# Patient Record
Sex: MALE | Race: WHITE | NOT HISPANIC OR LATINO | URBAN - METROPOLITAN AREA
[De-identification: names, ages, dates, MRNs, and addresses within clinical notes are randomized per-mention and may not be internally consistent; named-entity substitution may affect disease eponyms.]

---

## 2024-11-05 ENCOUNTER — TELEPHONE (OUTPATIENT)
Dept: PAIN MEDICINE | Facility: CLINIC | Age: 64
End: 2024-11-05

## 2024-11-05 ENCOUNTER — OFFICE VISIT (OUTPATIENT)
Dept: PAIN MEDICINE | Facility: CLINIC | Age: 64
End: 2024-11-05
Payer: MEDICARE

## 2024-11-05 VITALS
SYSTOLIC BLOOD PRESSURE: 148 MMHG | HEIGHT: 71 IN | HEART RATE: 63 BPM | DIASTOLIC BLOOD PRESSURE: 76 MMHG | WEIGHT: 275 LBS | BODY MASS INDEX: 38.5 KG/M2

## 2024-11-05 DIAGNOSIS — T85.192A MALFUNCTION OF SPINAL CORD STIMULATOR, INITIAL ENCOUNTER: Primary | ICD-10-CM

## 2024-11-05 DIAGNOSIS — Z45.42 BATTERY END OF LIFE OF SPINAL CORD STIMULATOR: Primary | ICD-10-CM

## 2024-11-05 DIAGNOSIS — Z45.42 BATTERY END OF LIFE OF SPINAL CORD STIMULATOR: ICD-10-CM

## 2024-11-05 PROCEDURE — 3078F DIAST BP <80 MM HG: CPT | Mod: CPTII,S$GLB,, | Performed by: STUDENT IN AN ORGANIZED HEALTH CARE EDUCATION/TRAINING PROGRAM

## 2024-11-05 PROCEDURE — 3008F BODY MASS INDEX DOCD: CPT | Mod: CPTII,S$GLB,, | Performed by: STUDENT IN AN ORGANIZED HEALTH CARE EDUCATION/TRAINING PROGRAM

## 2024-11-05 PROCEDURE — 99999 PR PBB SHADOW E&M-NEW PATIENT-LVL III: CPT | Mod: PBBFAC,,, | Performed by: STUDENT IN AN ORGANIZED HEALTH CARE EDUCATION/TRAINING PROGRAM

## 2024-11-05 PROCEDURE — 1159F MED LIST DOCD IN RCRD: CPT | Mod: CPTII,S$GLB,, | Performed by: STUDENT IN AN ORGANIZED HEALTH CARE EDUCATION/TRAINING PROGRAM

## 2024-11-05 PROCEDURE — 99203 OFFICE O/P NEW LOW 30 MIN: CPT | Mod: S$GLB,,, | Performed by: STUDENT IN AN ORGANIZED HEALTH CARE EDUCATION/TRAINING PROGRAM

## 2024-11-05 PROCEDURE — 3077F SYST BP >= 140 MM HG: CPT | Mod: CPTII,S$GLB,, | Performed by: STUDENT IN AN ORGANIZED HEALTH CARE EDUCATION/TRAINING PROGRAM

## 2024-11-05 NOTE — PROGRESS NOTES
[facility]    Referred by:   Self, Aaareferral    PCP:   No primary care provider on file.    CHIEF COMPLAINT:   Malfunctioning spinal cord stimulator    HISTORY OF PRESENT ILLNESS:   Gonzalez Pollard presents for evaluation of a malfunctioning spinal cord stimulator.  This was implanted in the mid 20-teens for lower back and leg pain associated with radiculopathy and neuropathy.  His system is a [employer] system.  He reports that several months ago, he began having difficulties charging the device.  This is happening intermittently but with increasing frequency.  Additionally, he reports that he has discomfort around the IPG and feels that the device catches on chairs when he sits down.  He does feel that this system is significantly beneficial to him when it is functioning.  He would like it replaced.  He has not had any significant changes in his lower back or leg symptoms.  He does not have any myelopathic symptoms.  They deny fever, chills, night sweats, N/V, diarrhea, SOB, and chest pain.    PAIN SCORES:  Vitals:    11/05/24 1315   PainSc:   6   PainLoc: Back     Review of patient's allergies indicates:  No Known Allergies  Penicillin    Past Medical History:   Diagnosis Date    BPH (benign prostatic hyperplasia)     Chronic pain     Chronic vertigo     CVA (cerebral vascular accident)     HLD (hyperlipidemia)     Insomnia     Low back pain     Neuropathy     Obesity, unspecified     ANN (obstructive sleep apnea)     Right-sided extracranial carotid artery stenosis     Type 2 diabetes mellitus without complications     Unspecified sensorineural hearing loss      Surgical history   Cervical fusion  Lumbar spine surgery x4  Right knee scope recently    Medications  Aspirin 81   Atorvastatin 40   B vitamin complex   Duloxetine 60 nightly   fexofenadine 180 daily   gabapentin 600 t.i.d.  Hydrocodone 5/325 1-2 tablets q.6 p.r.n.  Metformin 500mg daily  Flomax  Trazodone 150mg nightly      Social  "History:  Retired           Family history reviewed in the patient's chart.     PHYSICAL EXAMINATION  VITALS:   Vitals:    11/05/24 1315   BP: (!) 148/76   Pulse: 63   Weight: 124.7 kg (275 lb)   Height: 5' 11" (1.803 m)   PainSc:   6   PainLoc: Back     GENERAL: Calm, cooperative, pleasant  CHEST: No increased work of breathing  SKIN: Intact    MSK/NEURO:  IPG site in the left flank  Lumbar range of motion normal   Strength is intact throughout the right lower extremity and is very mildly diminished 4+ out of 5 in the left lower extremity  Sensation to light touch is diminished distally in both lower extremities  Reflexes are trace and symmetric at the patellar and Achilles tendons   No clonus  Flexor plantar responses    LABS:  9/2024  A1c 6.8%   Plt 286  Cr 0.69  eGFR >90  AST 24  ALT 30    IMAGING:    None to review    ASSESSMENT:   64 y.o. year old male with a malfunctioning spinal cord stimulator IPG as well as discomfort around the battery site.    Encounter Diagnoses   Name Primary?    Malfunction of spinal cord stimulator, initial encounter Yes    Battery end of life of spinal cord stimulator        PLAN:  Plan for a battery swap.  [employer] representatives were present and evaluated the patient and his system today.  They were unable to correct the charging errors.  Return 1 week after battery swap      Nikunj Reynolds MD  This note was completed with dictation software and grammatical/syntax errors may exist.    "

## 2024-11-05 NOTE — TELEPHONE ENCOUNTER
Types of orders made on 11/05/2024: Procedure Request      Order Date:11/5/2024   Ordering User:NIKUNJ VARGAS [[id number]]   Encounter Provider:Nikunj Vargas MD [[id number]]   Authorizing Provider: Nikunj Vargas MD [[id number]]   Department:[facility] PAIN MANAGEMENT[[id number]]      Common Order Information   Procedure -> Other (Specify location and laterality) Cmt: SCS battery swap [employer]      Order Specific Information   Order: Procedure Order to Pain Management [Custom: [id number]]  Order #:          [id number]: 1 FUTURE     Priority: Routine  Class: Clinic Performed     Future Order Information       Expires on:11/05/2025            Expected by:11/05/2024                   Associated Diagnoses       T85.192A Malfunction of spinal cord stimulator, initial encounter       Z45.42 Battery end of life of spinal cord stimulator       Physician -> Parminder          Is patient on anti-coagulants? Cmt: ASA          Facility Name: -> [place]          Follow-up: -> 1 week              Priority: Routine  Class: Clinic Performed     Future Order Information       Expires on:11/05/2025            Expected by:11/05/2024                   Associated Diagnoses       T85.192A Malfunction of spinal cord stimulator, initial encounter       Z45.42 Battery end of life of spinal cord stimulator       Procedure -> Other (Specify location and laterality) Cmt: SCS battery sw

## 2024-11-14 ENCOUNTER — TELEPHONE (OUTPATIENT)
Dept: PAIN MEDICINE | Facility: CLINIC | Age: 64
End: 2024-11-14
Payer: MEDICARE

## 2024-11-14 NOTE — TELEPHONE ENCOUNTER
----- Message from Yen sent at 11/14/2024  2:03 PM CST -----  Regarding: Needs Medical Advice  Contact: patient at [phone]  Type: Needs Medical Advice  Who Called:  patient at [phone]    Additional Information: calling to get details for procedure and if there is a preop. Please call and advise. Thank you

## 2024-11-18 RX ORDER — TAMSULOSIN HYDROCHLORIDE 0.4 MG/1
0.4 CAPSULE ORAL DAILY
COMMUNITY
Start: 2024-08-26

## 2024-11-18 RX ORDER — ATORVASTATIN CALCIUM 40 MG/1
1 TABLET, FILM COATED ORAL DAILY
COMMUNITY
Start: 2024-03-14

## 2024-11-18 RX ORDER — GABAPENTIN 600 MG/1
600 TABLET ORAL
COMMUNITY
Start: 2024-03-14

## 2024-11-18 RX ORDER — METFORMIN HYDROCHLORIDE 500 MG/1
500 TABLET, EXTENDED RELEASE ORAL DAILY
COMMUNITY
Start: 2024-05-22

## 2024-11-18 RX ORDER — ASPIRIN 81 MG/1
81 TABLET ORAL
COMMUNITY
Start: 2024-10-09

## 2024-11-18 RX ORDER — DULOXETIN HYDROCHLORIDE 60 MG/1
60 CAPSULE, DELAYED RELEASE ORAL DAILY
COMMUNITY
Start: 2024-08-26

## 2024-11-18 RX ORDER — TRAZODONE HYDROCHLORIDE 150 MG/1
1 TABLET ORAL NIGHTLY PRN
COMMUNITY
Start: 2024-09-12

## 2024-11-18 NOTE — OR NURSING
PAT done via phone. All preop instructions reviewed with verbalized understanding. Stated did not know how to get into MyChart.

## 2024-11-20 ENCOUNTER — ANESTHESIA EVENT (OUTPATIENT)
Dept: SURGERY | Facility: HOSPITAL | Age: 64
End: 2024-11-20
Payer: MEDICARE

## 2024-11-21 ENCOUNTER — HOSPITAL ENCOUNTER (OUTPATIENT)
Facility: HOSPITAL | Age: 64
Discharge: HOME | End: 2024-11-21
Attending: STUDENT IN AN ORGANIZED HEALTH CARE EDUCATION/TRAINING PROGRAM | Admitting: STUDENT IN AN ORGANIZED HEALTH CARE EDUCATION/TRAINING PROGRAM
Payer: MEDICARE

## 2024-11-21 ENCOUNTER — ANESTHESIA (OUTPATIENT)
Dept: SURGERY | Facility: HOSPITAL | Age: 64
End: 2024-11-21
Payer: MEDICARE

## 2024-11-21 VITALS
OXYGEN SATURATION: 96 % | HEART RATE: 53 BPM | BODY MASS INDEX: 38.5 KG/M2 | RESPIRATION RATE: 16 BRPM | TEMPERATURE: 98 F | HEIGHT: 71 IN | DIASTOLIC BLOOD PRESSURE: 77 MMHG | WEIGHT: 275 LBS | SYSTOLIC BLOOD PRESSURE: 141 MMHG

## 2024-11-21 DIAGNOSIS — Z01.818 PREOP TESTING: ICD-10-CM

## 2024-11-21 DIAGNOSIS — Z45.42 BATTERY END OF LIFE OF SPINAL CORD STIMULATOR: Primary | ICD-10-CM

## 2024-11-21 LAB
ANION GAP SERPL CALC-SCNC: 12 MMOL/L (ref 8–16)
BASOPHILS # BLD AUTO: 0.02 K/UL (ref 0–0.2)
BASOPHILS NFR BLD: 0.3 % (ref 0–1.9)
BUN SERPL-MCNC: 11 MG/DL (ref 8–23)
CALCIUM SERPL-MCNC: 9.2 MG/DL (ref 8.7–10.5)
CHLORIDE SERPL-SCNC: 109 MMOL/L (ref 95–110)
CO2 SERPL-SCNC: 19 MMOL/L (ref 23–29)
CREAT SERPL-MCNC: 0.8 MG/DL (ref 0.5–1.4)
DIFFERENTIAL METHOD BLD: NORMAL
EOSINOPHIL # BLD AUTO: 0.1 K/UL (ref 0–0.5)
EOSINOPHIL NFR BLD: 1.4 % (ref 0–8)
ERYTHROCYTE [DISTWIDTH] IN BLOOD BY AUTOMATED COUNT: 13 % (ref 11.5–14.5)
EST. GFR  (NO RACE VARIABLE): >60 ML/MIN/1.73 M^2
GLUCOSE SERPL-MCNC: 129 MG/DL (ref 70–110)
HCT VFR BLD AUTO: 45 % (ref 40–54)
HGB BLD-MCNC: 14.7 G/DL (ref 14–18)
IMM GRANULOCYTES # BLD AUTO: 0.02 K/UL (ref 0–0.04)
IMM GRANULOCYTES NFR BLD AUTO: 0.3 % (ref 0–0.5)
LYMPHOCYTES # BLD AUTO: 1.7 K/UL (ref 1–4.8)
LYMPHOCYTES NFR BLD: 22 % (ref 18–48)
MCH RBC QN AUTO: 29.7 PG (ref 27–31)
MCHC RBC AUTO-ENTMCNC: 32.7 G/DL (ref 32–36)
MCV RBC AUTO: 91 FL (ref 82–98)
MONOCYTES # BLD AUTO: 0.9 K/UL (ref 0.3–1)
MONOCYTES NFR BLD: 12.2 % (ref 4–15)
NEUTROPHILS # BLD AUTO: 4.9 K/UL (ref 1.8–7.7)
NEUTROPHILS NFR BLD: 63.8 % (ref 38–73)
NRBC BLD-RTO: 0 /100 WBC
OHS QRS DURATION: 96 MS
OHS QTC CALCULATION: 414 MS
PLATELET # BLD AUTO: 278 K/UL (ref 150–450)
PMV BLD AUTO: 10.1 FL (ref 9.2–12.9)
POTASSIUM SERPL-SCNC: 4.2 MMOL/L (ref 3.5–5.1)
RBC # BLD AUTO: 4.95 M/UL (ref 4.6–6.2)
SODIUM SERPL-SCNC: 140 MMOL/L (ref 136–145)
WBC # BLD AUTO: 7.68 K/UL (ref 3.9–12.7)

## 2024-11-21 PROCEDURE — 93005 ELECTROCARDIOGRAM TRACING: CPT

## 2024-11-21 PROCEDURE — 71000033 HC RECOVERY, INTIAL HOUR: Performed by: STUDENT IN AN ORGANIZED HEALTH CARE EDUCATION/TRAINING PROGRAM

## 2024-11-21 PROCEDURE — 71000015 HC POSTOP RECOV 1ST HR: Performed by: STUDENT IN AN ORGANIZED HEALTH CARE EDUCATION/TRAINING PROGRAM

## 2024-11-21 PROCEDURE — C1822 GEN, NEURO, HF, RECHG BAT: HCPCS | Performed by: STUDENT IN AN ORGANIZED HEALTH CARE EDUCATION/TRAINING PROGRAM

## 2024-11-21 PROCEDURE — 36000707: Performed by: STUDENT IN AN ORGANIZED HEALTH CARE EDUCATION/TRAINING PROGRAM

## 2024-11-21 PROCEDURE — 93010 ELECTROCARDIOGRAM REPORT: CPT | Mod: ,,, | Performed by: GENERAL PRACTICE

## 2024-11-21 PROCEDURE — 37000008 HC ANESTHESIA 1ST 15 MINUTES: Performed by: STUDENT IN AN ORGANIZED HEALTH CARE EDUCATION/TRAINING PROGRAM

## 2024-11-21 PROCEDURE — 25000003 PHARM REV CODE 250: Performed by: ANESTHESIOLOGY

## 2024-11-21 PROCEDURE — 63600175 PHARM REV CODE 636 W HCPCS: Performed by: NURSE ANESTHETIST, CERTIFIED REGISTERED

## 2024-11-21 PROCEDURE — 71000039 HC RECOVERY, EACH ADD'L HOUR: Performed by: STUDENT IN AN ORGANIZED HEALTH CARE EDUCATION/TRAINING PROGRAM

## 2024-11-21 PROCEDURE — 27201423 OPTIME MED/SURG SUP & DEVICES STERILE SUPPLY: Performed by: STUDENT IN AN ORGANIZED HEALTH CARE EDUCATION/TRAINING PROGRAM

## 2024-11-21 PROCEDURE — 63600175 PHARM REV CODE 636 W HCPCS: Mod: JZ,JG | Performed by: STUDENT IN AN ORGANIZED HEALTH CARE EDUCATION/TRAINING PROGRAM

## 2024-11-21 PROCEDURE — 36415 COLL VENOUS BLD VENIPUNCTURE: CPT | Performed by: ANESTHESIOLOGY

## 2024-11-21 PROCEDURE — 63685 INS/RPLC SPI NPG/RCVR POCKET: CPT | Mod: ,,, | Performed by: STUDENT IN AN ORGANIZED HEALTH CARE EDUCATION/TRAINING PROGRAM

## 2024-11-21 PROCEDURE — 85025 COMPLETE CBC W/AUTO DIFF WBC: CPT | Performed by: ANESTHESIOLOGY

## 2024-11-21 PROCEDURE — 94799 UNLISTED PULMONARY SVC/PX: CPT

## 2024-11-21 PROCEDURE — 71000016 HC POSTOP RECOV ADDL HR: Performed by: STUDENT IN AN ORGANIZED HEALTH CARE EDUCATION/TRAINING PROGRAM

## 2024-11-21 PROCEDURE — 80048 BASIC METABOLIC PNL TOTAL CA: CPT | Performed by: ANESTHESIOLOGY

## 2024-11-21 PROCEDURE — 36000706: Performed by: STUDENT IN AN ORGANIZED HEALTH CARE EDUCATION/TRAINING PROGRAM

## 2024-11-21 PROCEDURE — 25000003 PHARM REV CODE 250: Performed by: NURSE ANESTHETIST, CERTIFIED REGISTERED

## 2024-11-21 PROCEDURE — 37000009 HC ANESTHESIA EA ADD 15 MINS: Performed by: STUDENT IN AN ORGANIZED HEALTH CARE EDUCATION/TRAINING PROGRAM

## 2024-11-21 DEVICE — IPG MODEL IPG3000 KIT, NIPG3000
Type: IMPLANTABLE DEVICE | Site: SACRUM | Status: FUNCTIONAL
Brand: SENZA

## 2024-11-21 RX ORDER — ACETAMINOPHEN 10 MG/ML
INJECTION, SOLUTION INTRAVENOUS
Status: DISCONTINUED | OUTPATIENT
Start: 2024-11-21 | End: 2024-11-21

## 2024-11-21 RX ORDER — OXYCODONE HYDROCHLORIDE 5 MG/1
5 TABLET ORAL ONCE AS NEEDED
Status: COMPLETED | OUTPATIENT
Start: 2024-11-21 | End: 2024-11-21

## 2024-11-21 RX ORDER — FENTANYL CITRATE 50 UG/ML
INJECTION, SOLUTION INTRAMUSCULAR; INTRAVENOUS
Status: DISCONTINUED | OUTPATIENT
Start: 2024-11-21 | End: 2024-11-21

## 2024-11-21 RX ORDER — DEXAMETHASONE SODIUM PHOSPHATE 4 MG/ML
INJECTION, SOLUTION INTRA-ARTICULAR; INTRALESIONAL; INTRAMUSCULAR; INTRAVENOUS; SOFT TISSUE
Status: DISCONTINUED | OUTPATIENT
Start: 2024-11-21 | End: 2024-11-21

## 2024-11-21 RX ORDER — CLINDAMYCIN PHOSPHATE 900 MG/50ML
900 INJECTION, SOLUTION INTRAVENOUS
Status: COMPLETED | OUTPATIENT
Start: 2024-11-21 | End: 2024-11-21

## 2024-11-21 RX ORDER — GLYCOPYRROLATE 0.2 MG/ML
INJECTION INTRAMUSCULAR; INTRAVENOUS
Status: DISCONTINUED | OUTPATIENT
Start: 2024-11-21 | End: 2024-11-21

## 2024-11-21 RX ORDER — MIDAZOLAM HYDROCHLORIDE 1 MG/ML
INJECTION INTRAMUSCULAR; INTRAVENOUS
Status: DISCONTINUED | OUTPATIENT
Start: 2024-11-21 | End: 2024-11-21

## 2024-11-21 RX ORDER — METOCLOPRAMIDE HYDROCHLORIDE 5 MG/ML
10 INJECTION INTRAMUSCULAR; INTRAVENOUS EVERY 10 MIN PRN
Status: DISCONTINUED | OUTPATIENT
Start: 2024-11-21 | End: 2024-11-21 | Stop reason: HOSPADM

## 2024-11-21 RX ORDER — LIDOCAINE HYDROCHLORIDE 10 MG/ML
1 INJECTION, SOLUTION EPIDURAL; INFILTRATION; INTRACAUDAL; PERINEURAL ONCE
Status: DISCONTINUED | OUTPATIENT
Start: 2024-11-21 | End: 2024-11-21 | Stop reason: HOSPADM

## 2024-11-21 RX ORDER — FENTANYL CITRATE 50 UG/ML
25 INJECTION, SOLUTION INTRAMUSCULAR; INTRAVENOUS EVERY 5 MIN PRN
Status: DISCONTINUED | OUTPATIENT
Start: 2024-11-21 | End: 2024-11-21 | Stop reason: HOSPADM

## 2024-11-21 RX ORDER — SODIUM CHLORIDE, SODIUM LACTATE, POTASSIUM CHLORIDE, CALCIUM CHLORIDE 600; 310; 30; 20 MG/100ML; MG/100ML; MG/100ML; MG/100ML
INJECTION, SOLUTION INTRAVENOUS CONTINUOUS
Status: DISCONTINUED | OUTPATIENT
Start: 2024-11-21 | End: 2024-11-21 | Stop reason: HOSPADM

## 2024-11-21 RX ORDER — DEXMEDETOMIDINE HYDROCHLORIDE 100 UG/ML
INJECTION, SOLUTION INTRAVENOUS
Status: DISCONTINUED | OUTPATIENT
Start: 2024-11-21 | End: 2024-11-21

## 2024-11-21 RX ORDER — KETAMINE HCL IN 0.9 % NACL 50 MG/5 ML
SYRINGE (ML) INTRAVENOUS
Status: DISCONTINUED | OUTPATIENT
Start: 2024-11-21 | End: 2024-11-21

## 2024-11-21 RX ORDER — ONDANSETRON HYDROCHLORIDE 2 MG/ML
INJECTION, SOLUTION INTRAVENOUS
Status: DISCONTINUED | OUTPATIENT
Start: 2024-11-21 | End: 2024-11-21

## 2024-11-21 RX ADMIN — Medication 5 MG: at 08:11

## 2024-11-21 RX ADMIN — CLINDAMYCIN PHOSPHATE 900 MG: 900 INJECTION, SOLUTION INTRAVENOUS at 08:11

## 2024-11-21 RX ADMIN — FENTANYL CITRATE 50 MCG: 50 INJECTION, SOLUTION INTRAMUSCULAR; INTRAVENOUS at 08:11

## 2024-11-21 RX ADMIN — MIDAZOLAM HYDROCHLORIDE 2 MG: 1 INJECTION, SOLUTION INTRAMUSCULAR; INTRAVENOUS at 08:11

## 2024-11-21 RX ADMIN — SODIUM CHLORIDE, SODIUM GLUCONATE, SODIUM ACETATE, POTASSIUM CHLORIDE, MAGNESIUM CHLORIDE, SODIUM PHOSPHATE, DIBASIC, AND POTASSIUM PHOSPHATE: .53; .5; .37; .037; .03; .012; .00082 INJECTION, SOLUTION INTRAVENOUS at 07:11

## 2024-11-21 RX ADMIN — ONDANSETRON 4 MG: 2 INJECTION INTRAMUSCULAR; INTRAVENOUS at 08:11

## 2024-11-21 RX ADMIN — GLYCOPYRROLATE 0.2 MG: 0.2 INJECTION INTRAMUSCULAR; INTRAVENOUS at 08:11

## 2024-11-21 RX ADMIN — DEXMEDETOMIDINE HYDROCHLORIDE 4 MCG: 100 INJECTION, SOLUTION INTRAVENOUS at 08:11

## 2024-11-21 RX ADMIN — DEXAMETHASONE SODIUM PHOSPHATE 4 MG: 4 INJECTION, SOLUTION INTRA-ARTICULAR; INTRALESIONAL; INTRAMUSCULAR; INTRAVENOUS; SOFT TISSUE at 08:11

## 2024-11-21 RX ADMIN — ACETAMINOPHEN 1000 MG: 10 INJECTION INTRAVENOUS at 08:11

## 2024-11-21 RX ADMIN — OXYCODONE 5 MG: 5 TABLET ORAL at 09:11

## 2024-11-21 NOTE — PLAN OF CARE
VSS, pain tolerable, pain pill given, dressing to back cdi, tolerated clear liquids without N/V. Pt transferred to phase II. Report given to VIKTOR Lu.

## 2024-11-21 NOTE — TRANSFER OF CARE
"Anesthesia Transfer of Care Note    Patient: Gonzalez Pollard    Procedure(s) Performed: Procedure(s) (LRB):  Insertion, Neurostimulator, Spinal Cord battery exchange ( tiffanie dickinson) (N/A)    Patient location: PACU    Anesthesia Type: MAC    Transport from OR: Transported from OR on 2-3 L/min O2 by NC with adequate spontaneous ventilation    Post pain: adequate analgesia    Post assessment: no apparent anesthetic complications and tolerated procedure well    Post vital signs: stable    Level of consciousness: sedated and responds to stimulation    Nausea/Vomiting: no nausea/vomiting    Complications: none    Transfer of care protocol was followed    Last vitals: Visit Vitals  /60 (BP Location: Right arm, Patient Position: Lying)   Pulse (!) 51   Temp 36.7 °C (98.1 °F) (Skin)   Resp 18   Ht 5' 11" (1.803 m)   Wt 124.7 kg (275 lb)   SpO2 95%   BMI 38.35 kg/m²     "

## 2024-11-21 NOTE — ANESTHESIA PREPROCEDURE EVALUATION
11/21/2024  Gonzalez Pollard is a 64 y.o., male.      Pre-op Assessment    I have reviewed the Patient Summary Reports.     I have reviewed the Nursing Notes. I have reviewed the NPO Status.   I have reviewed the Medications.     Review of Systems  Anesthesia Hx:  No problems with previous Anesthesia                Social:  Non-Smoker       Cardiovascular:  Cardiovascular Normal                                              Pulmonary:        Sleep Apnea                Renal/:    BPH              Musculoskeletal:         Spine Disorders:  Chronic Pain           Neurological:   CVA              Peripheral Neuropathy                          Endocrine:  Diabetes, well controlled, type 2         Obesity / BMI > 30, Morbid Obesity / BMI > 40      Physical Exam  General: Well nourished, Cooperative, Alert and Oriented    Airway:  Mallampati: II   Mouth Opening: Normal  TM Distance: Normal  Neck ROM: Normal ROM    Anesthesia Plan  Type of Anesthesia, risks & benefits discussed:    Anesthesia Type: Gen ETT, Gen Supraglottic Airway, Gen Natural Airway, MAC  Intra-op Monitoring Plan: Standard ASA Monitors  Post Op Pain Control Plan: multimodal analgesia  Induction:  IV  Airway Plan: Direct, Video and Fiberoptic, Post-Induction  Informed Consent: Informed consent signed with the Patient and all parties understand the risks and agree with anesthesia plan.  All questions answered.   ASA Score: 3    Ready For Surgery From Anesthesia Perspective.   .

## 2024-11-21 NOTE — PLAN OF CARE
Patient awake, alert, oriented. Vital signs stable. Patient verbalizes readiness for discharge. Discharge instructions reviewed with patient and patient's spouse. Patient and patient's spouse verbalized understanding. IV removed, catheter intact. Dressing to back clean, dry, and intact. Abdominal binder on. All belongings returned to patient. Patient transferred to car via wheelchair per ran Cisse. Safety maintained.

## 2024-11-21 NOTE — DISCHARGE SUMMARY
[facility]  Discharge Note  Short Stay     Admit Date: 11/21/2024    Discharge Date: 11/21/2024     Attending Physician: Nikunj Reynolds MD    Diagnoses:  SCS battery exchange    Discharged Condition: Good     Hospital Course: Patient was admitted for an outpatient interventional pain management procedure and tolerated the procedure well with no complications.     Final Diagnoses: Same as principal problem.     Disposition: Home or Self Care     Follow up/Patient Instructions:   Follow-up in 1 week unless otherwise instructed. May return sooner as needed.       Reconciled Medications:     Medication List        CONTINUE taking these medications      aspirin 81 MG EC tablet  Commonly known as: ECOTRIN  Take 81 mg by mouth.     atorvastatin 40 MG tablet  Commonly known as: LIPITOR  Take 1 tablet by mouth once daily.     DULoxetine 60 MG capsule  Commonly known as: CYMBALTA  Take 60 mg by mouth once daily.     gabapentin 600 MG tablet  Commonly known as: NEURONTIN  Take 600 mg by mouth.     metFORMIN 500 MG ER 24hr tablet  Commonly known as: GLUCOPHAGE-XR  Take 500 mg by mouth Daily.     tamsulosin 0.4 mg Cap  Commonly known as: FLOMAX  Take 0.4 mg by mouth once daily.     traZODone 150 MG tablet  Commonly known as: DESYREL  Take 1 tablet by mouth nightly as needed.             Discharge Procedure Orders (must include Diet, Follow-up, Activity)   Ice to affected area   Order Comments: 20 minutes of ice or until area numb to the touch if area is sore 2-3 times per day as needed     No driving until:   Order Comments: Until following day     Notify your health care provider if you experience any of the following:  temperature >100.4     Notify your health care provider if you experience any of the following:  persistent nausea and vomiting or diarrhea     Notify your health care provider if you experience any of the following:  severe uncontrolled pain     Notify your health care provider if you  experience any of the following:  redness, tenderness, or signs of infection (pain, swelling, redness, odor or green/yellow discharge around incision site)     Notify your health care provider if you experience any of the following:  difficulty breathing or increased cough     Notify your health care provider if you experience any of the following:  severe persistent headache     Notify your health care provider if you experience any of the following:  worsening rash     Notify your health care provider if you experience any of the following:  persistent dizziness, light-headedness, or visual disturbances     Notify your health care provider if you experience any of the following:  increased confusion or weakness     Leave dressing on - Keep it clean, dry, and intact until clinic visit     Sponge bath only until clinic visit       Nikunj Reynolds MD  Interventional Pain Medicine / Physical Medicine & Rehabilitation

## 2024-11-21 NOTE — OP NOTE
PROCEDURE DATE: 11/21/2024    PROCEDURE:   1. Spinal Cord Stimulator IPG exchange    Pre-op diagnosis:  1. Spinal cord stimulator malfunction  2. End of battery life    Post-op diagnosis: Same    Surgeon: Nikunj Reynolds MD    Assistant: none     Anesthesia: MAC per CRNA. See anesthesia records for details.    CONSENT: The risks, benefits, and options were discussed thoroughly with the patient. The patient's questions were answered. The patient understands the risk and benefits and wishes to proceed. Informed consent was obtained.     TECHNIQUE:  Site of the implantable pulse generator was marked on the patients left side in the preoperative area. The patient was taken to the OR and placed in the prone position. The anesthesia provider throughout the case provided sedation and cardiopulmonary monitoring. All activities were ceased in the OR and a time out was performed with all participating individuals.     The Patient's back draped with 10X10s and prepped with Chlorhexidine. It was again prepped with Chlorhexidine and then draped in usual sterile fashion and then covered with Ioban. Fluoroscopy was used to confirm the site of the IPG and the placement of the leads in the epidural space. The site of the incision was marked. 10 mL of Lidocaine 1% with epinephrine was injected around the incision site with 25G 1.5 inch needle.  A No. 10 scalpel was then used to create an incision approximately 4 cm in length over the previous IPG incision. Electrocautery was not necessary as there was no significant bleeding. The tissue was lifted up away from the IPG and blunt dissection was used to enter into the pocket.     The battery was accessed and removed from pocket after an anchoring suture on the cephalad aspect was cut. The leads were still attached. The leads were removed from the old battery and then placed in exact location of the new battery. Then the system was checked by device representative in sterile  fashion, found to be functioning correctly. The pocket was checked closely and hemostasis was confirmed. The pocket was copiously irrigated with antibiotic lavage. The IPG was placed in the pocket with a stress release loop underneath the IPG. The IPG was found to be seated appropriately within the pocket with sufficient tissue for closure.     Then the wound was closed with simple interrupted sutures in the deep layer using 2-0 Vicryl sutures and then a running subcuticular 4-0 Monocryl suture. Dermabond and steristrips were placed over the incision to seal it. Sponge and needle counts were correct x2 at conclusion of the case. The site of the IPG and the location of the leads within the epidural space was again confirmed with fluoroscopy.     Patient was then placed supine on the stretcher and transferred to the recovery room. Patient was programmed by the representative of the SCS. Patient was instructed not to perform any abrupt movements with the back and to keep the surgical site clean, dry, and intact until follow-up. The patient has been scheduled to return to the clinic in approx 5-7 days. The patient tolerated the procedure well.    Estimated Blood Loss: Minimal <10ml    Urine Output: Not Measured    IV Fluids- See Anesthesia record    Complications: None. Patient doing well in the PACU following the procedure.    Nikunj Reynolds MD  11/21/2024

## 2024-11-21 NOTE — DISCHARGE INSTRUCTIONS
"Discharge Instructions: After Your Surgery/Procedure  Youve just had surgery. During surgery you were given medicine called anesthesia to keep you relaxed and free of pain. After surgery you may have some pain or nausea. This is common. Here are some tips for feeling better and getting well after surgery.     Stay on schedule with your medication.   Going home  Your doctor or nurse will show you how to take care of yourself when you go home. He or she will also answer your questions. Have an adult family member or friend drive you home.      For your safety we recommend these precaution for the first 24 hours after your procedure:  Do not drive or use heavy equipment.  Do not make important decisions or sign legal papers.  Do not drink alcohol.  Have someone stay with you, if needed. He or she can watch for problems and help keep you safe.  Your concentration, balance, coordination, and judgement may be impaired for many hours after anesthesia.  Use caution when ambulating or standing up.     You may feel weak and "washed out" after anesthesia and surgery.      Subtle residual effects of general anesthesia or sedation with regional / local anesthesia can last more than 24 hours.  Rest for the remainder of the day or longer if your Doctor/Surgeon has advised you to do so.  Although you may feel normal within the first 24 hours, your reflexes and mental ability may be impaired without you realizing it.  You may feel dizzy, lightheaded or sleepy for 24 hours or longer.      Be sure to go to all follow-up visits with your doctor. And rest after your surgery for as long as your doctor tells you to.  Coping with pain  If you have pain after surgery, pain medicine will help you feel better. Take it as told, before pain becomes severe. Also, ask your doctor or pharmacist about other ways to control pain. This might be with heat, ice, or relaxation. And follow any other instructions your surgeon or nurse gives you.  Tips " for taking pain medicine  To get the best relief possible, remember these points:  Pain medicines can upset your stomach. Taking them with a little food may help.  Most pain relievers taken by mouth need at least 20 to 30 minutes to start to work.  Taking medicine on a schedule can help you remember to take it. Try to time your medicine so that you can take it before starting an activity. This might be before you get dressed, go for a walk, or sit down for dinner.  Constipation is a common side effect of pain medicines. Call your doctor before taking any medicines such as laxatives or stool softeners to help ease constipation. Also ask if you should skip any foods. Drinking lots of fluids and eating foods such as fruits and vegetables that are high in fiber can also help. Remember, do not take laxatives unless your surgeon has prescribed them.  Drinking alcohol and taking pain medicine can cause dizziness and slow your breathing. It can even be deadly. Do not drink alcohol while taking pain medicine.  Pain medicine can make you react more slowly to things. Do not drive or run machinery while taking pain medicine.  Your health care provider may tell you to take acetaminophen to help ease your pain. Ask him or her how much you are supposed to take each day. Acetaminophen or other pain relievers may interact with your prescription medicines or other over-the-counter (OTC) drugs. Some prescription medicines have acetaminophen and other ingredients. Using both prescription and OTC acetaminophen for pain can cause you to overdose. Read the labels on your OTC medicines with care. This will help you to clearly know the list of ingredients, how much to take, and any warnings. It may also help you not take too much acetaminophen. If you have questions or do not understand the information, ask your pharmacist or health care provider to explain it to you before you take the OTC medicine.  Managing nausea  Some people have an  upset stomach after surgery. This is often because of anesthesia, pain, or pain medicine, or the stress of surgery. These tips will help you handle nausea and eat healthy foods as you get better. If you were on a special food plan before surgery, ask your doctor if you should follow it while you get better. These tips may help:  Do not push yourself to eat. Your body will tell you when to eat and how much.  Start off with clear liquids and soup. They are easier to digest.  Next try semi-solid foods, such as mashed potatoes, applesauce, and gelatin, as you feel ready.  Slowly move to solid foods. Dont eat fatty, rich, or spicy foods at first.  Do not force yourself to have 3 large meals a day. Instead eat smaller amounts more often.  Take pain medicines with a small amount of solid food, such as crackers or toast, to avoid nausea.     Call your surgeon if  You still have pain an hour after taking medicine. The medicine may not be strong enough.  You feel too sleepy, dizzy, or groggy. The medicine may be too strong.  You have side effects like nausea, vomiting, or skin changes, such as rash, itching, or hives.       If you have obstructive sleep apnea  You were given anesthesia medicine during surgery to keep you comfortable and free of pain. After surgery, you may have more apnea spells because of this medicine and other medicines you were given. The spells may last longer than usual.   At home:  Keep using the continuous positive airway pressure (CPAP) device when you sleep. Unless your health care provider tells you not to, use it when you sleep, day or night. CPAP is a common device used to treat obstructive sleep apnea.  Talk with your provider before taking any pain medicine, muscle relaxants, or sedatives. Your provider will tell you about the possible dangers of taking these medicines.  © 1779-3343 [employer]. [address] [place] [address], [place] [address]. All rights reserved. This information is  not intended as a substitute for professional medical care. Always follow your healthcare professional's instructions.          Using an Incentive Spirometer    An incentive spirometer is a device that helps you do deep breathing exercises. These exercises expand your lungs, aid in circulation, and help prevent pneumonia. Deep breathing exercises also help you breathe better and improve the function of your lungs by:  Keeping your lungs clear  Strengthening your breathing muscles  Helping prevent respiratory complications or problems  The incentive spirometer gives you a way to take an active part in recover. A nurse or therapist will teach you breathing exercises. To do these exercises, you will breathe in through your mouth and not your nose. The incentive spirometer only works correctly if you breathe in through your mouth.  Steps to clear lungs  Step 1. Exhale normally. Then, inhale normally.  Relax and breathe out.  Step 2. Place your lips tightly around the mouthpiece.  Make sure the device is upright and not tilted.  Step 3. Inhale as much air as you can through the mouthpiece (don't breath through your nose).  Inhale slowly and deeply.  Hold your breath long enough to keep the balls or disk raised for at least 3 to 5 seconds, or as instructed by your healthcare provider.  Some spirometers have an indicator to let you know that you are breathing in too fast. If the indicator goes off, breathe in more slowly.  Step 4. Repeat the exercise regularly.  Do this exercise every hour while you're awake, or as instructed by your healthcare provider.  If you were taught deep breathing and coughing exercises, do them regularly as instructed by your healthcare provider.           Post op instructions for prevention of DVT  What is deep vein thrombosis?  Deep vein thrombosis (DVT) is the medical term for blood clots in the deep veins of the leg.  These blood clots can be dangerous.  A DVT can block a blood vessel and keep  blood from getting where it needs to go.  Another problem is that the clot can travel to other parts of the body such as the lungs.  A clot that travels to the lungs is called a pulmonary embolus (PE) and can cause serious problems with breathing which can lead to death.  Am I at risk for DVT/PE?  If you are not very active, you are at risk of DVT.  Anyone confined to bed, sitting for long periods of time, recovering from surgery, etc. increases the risk of DVT.  Other risk factors are cancer diagnosis, certain medications, estrogen replacement in any form,older age, obesity, pregnancy, smoking, history of clotting disorders, and dehydration.  How will I know if I have a DVT?  Swelling in the lower leg  Pain  Warmth, redness, hardness or bulging of the vein  If you have any of these symptoms, call your doctors office right away.  Some people will not have any symptoms until the clot moves to the lungs.  What are the symptoms of a PE?  Panting, shortness of breath, or trouble breathing  Sharp, knife-like chest pain when you breathe  Coughing or coughing up blood  Rapid heartbeat  If you have any of these symptoms or get worse quickly, call 911 for emergency treatment.  How can I prevent a DVT?  Avoid long periods of inactivity and dont cross your legs--get up and walk around every hour or so.  Stay active--walking after surgery is highly encouraged.  This means you should get out of the house and walk in the neighborhood.  Going up and down stairs will not impair healing (unless advised against such activity by your doctor).    Drink plenty of noncaffeinated, nonalcoholic fluids each day to prevent dehydration.  Wear special support stockings, if they have been advised by your doctor.  If you travel, stop at least once an hour and walk around.  Avoid smoking (assistance with stopping is available through your healthcare provider)  Always notify your doctor if you are not able to follow the post operative  instructions that are given to you at the time of discharge.  It may be necessary to prescribe one of the medications available to prevent DVT.          We hope your stay was comfortable as you heal now, mend and rest.    For we have enjoyed taking care of you by giving your our best.    And as you get better, by regaining your health and strength;   We count it as a privilege to have served you and hope your time at [facility] was well spent.      Thank  You!!!

## 2024-11-21 NOTE — ANESTHESIA POSTPROCEDURE EVALUATION
Anesthesia Post Evaluation    Patient: Gonzalez Pollard    Procedure(s) Performed: Procedure(s) (LRB):  Insertion, Neurostimulator, Spinal Cord battery exchange ( tiffanie dickinson) (N/A)    Final Anesthesia Type: MAC      Patient location during evaluation: PACU  Patient participation: Yes- Able to Participate  Level of consciousness: awake and alert  Post-procedure vital signs: reviewed and stable  Pain management: adequate  Airway patency: patent    PONV status at discharge: No PONV  Anesthetic complications: no      Cardiovascular status: blood pressure returned to baseline  Respiratory status: unassisted  Hydration status: euvolemic  Follow-up not needed.              Vitals Value Taken Time   /58 11/21/24 1020   Temp 36.8 °C (98.2 °F) 11/21/24 1025   Pulse 54 11/21/24 1025   Resp 15 11/21/24 1025   SpO2 96 % 11/21/24 1025         Event Time   Out of Recovery 10:28:00         Pain/Marilee Score: Pain Rating Prior to Med Admin: 4 (11/21/2024  9:30 AM)  Pain Rating Post Med Admin: 3 (11/21/2024  9:50 AM)  Marilee Score: 10 (11/21/2024 10:20 AM)

## 2024-11-21 NOTE — INTERVAL H&P NOTE
The patient has been examined and the H&P has been reviewed:    I concur with the findings and no changes have occurred since H&P was written.    Anesthesia/Surgery risks, benefits and alternative options discussed and understood by patient/family.    RRR  CTABL      There are no hospital problems to display for this patient.

## 2024-11-27 ENCOUNTER — OFFICE VISIT (OUTPATIENT)
Dept: PAIN MEDICINE | Facility: CLINIC | Age: 64
End: 2024-11-27
Payer: MEDICARE

## 2024-11-27 VITALS
SYSTOLIC BLOOD PRESSURE: 124 MMHG | HEIGHT: 71 IN | BODY MASS INDEX: 38.49 KG/M2 | WEIGHT: 274.94 LBS | HEART RATE: 73 BPM | DIASTOLIC BLOOD PRESSURE: 73 MMHG

## 2024-11-27 DIAGNOSIS — Z45.42 BATTERY END OF LIFE OF SPINAL CORD STIMULATOR: Primary | ICD-10-CM

## 2024-11-27 PROCEDURE — 3078F DIAST BP <80 MM HG: CPT | Mod: CPTII,S$GLB,, | Performed by: STUDENT IN AN ORGANIZED HEALTH CARE EDUCATION/TRAINING PROGRAM

## 2024-11-27 PROCEDURE — 99212 OFFICE O/P EST SF 10 MIN: CPT | Mod: S$GLB,,, | Performed by: STUDENT IN AN ORGANIZED HEALTH CARE EDUCATION/TRAINING PROGRAM

## 2024-11-27 PROCEDURE — 99999 PR PBB SHADOW E&M-EST. PATIENT-LVL II: CPT | Mod: PBBFAC,,, | Performed by: STUDENT IN AN ORGANIZED HEALTH CARE EDUCATION/TRAINING PROGRAM

## 2024-11-27 PROCEDURE — 3074F SYST BP LT 130 MM HG: CPT | Mod: CPTII,S$GLB,, | Performed by: STUDENT IN AN ORGANIZED HEALTH CARE EDUCATION/TRAINING PROGRAM

## 2024-11-27 PROCEDURE — 3008F BODY MASS INDEX DOCD: CPT | Mod: CPTII,S$GLB,, | Performed by: STUDENT IN AN ORGANIZED HEALTH CARE EDUCATION/TRAINING PROGRAM

## 2024-11-27 NOTE — PROGRESS NOTES
"Interventional Pain Clinic    CHIEF COMPLAINT:   Postop IPG swap    HISTORY OF PRESENT ILLNESS:   Gonzalez Pollard presents for 1 week postop visit after undergoing an IPG battery exchange.  Has no complaints today.  No symptoms of infection noted by the patient.  Endorses good relief from his stimulation system.  No questions.      PHYSICAL EXAMINATION  VITALS:   Vitals:    11/27/24 1407   BP: 124/73   Pulse: 73   Weight: 124.7 kg (274 lb 14.6 oz)   Height: 5' 11" (1.803 m)   PainSc:   3   PainLoc: Back     GENERAL: Calm, cooperative, pleasant  CHEST: No increased work of breathing  SKIN: Intact    MSK/NEURO:  Surgical dressing is intact and clean   Upon removing the surgical dressing, the incision in the left lower flank/lumbar region is noted to be clean and dry.  There is no erythema or drainage present.  There was no fluctuance or tenderness around the IPG site.    ASSESSMENT:   64 y.o. year old male with routine healing following an IPG battery exchange.    Encounter Diagnosis   Name Primary?    Battery end of life of spinal cord stimulator Yes       PLAN:  He is cleared to shower and perform his ADLs without restrictions.  I did advise him to leave the Steri-Strips intact and to let them fall off naturally.  Also advised against scrubbing the area or leading the shower water spray directly on it.  [employer] representative present today, assessed his system, looks good.  Follow up p.r.n..      Nikunj Reynolds MD  This note was completed with dictation software and grammatical/syntax errors may exist.    "

## 2025-03-27 ENCOUNTER — TELEPHONE (OUTPATIENT)
Dept: PAIN MEDICINE | Facility: CLINIC | Age: 65
End: 2025-03-27
Payer: MEDICARE

## 2025-03-27 NOTE — TELEPHONE ENCOUNTER
----- Message from Fabián sent at 3/27/2025 10:00 AM CDT -----  Type:  Sooner Apoointment RequestCaller is requesting a sooner appointment.  Caller declined first available appointment listed below.  Caller will not accept being placed on the waitlist and is requesting a message be sent to doctor.Name of Caller:PtWhen is the first available appointment?5/5Symptoms:Neurostimulator site painWould the patient rather a call back or a response via [facility]? [facility]:[phone] Additional Information: Pt adv he is having trouble with the site of the neurosimulator. He is in pain and is requesting a sooner appt or he feels he will have to visit the ED. Pls call back and adv. Thank you.

## 2025-03-27 NOTE — TELEPHONE ENCOUNTER
Spoke with pt and he says he does have some swleling and the site it warm to the touch I advised him to go to ER to have evaulation for infection

## 2025-03-28 ENCOUNTER — OFFICE VISIT (OUTPATIENT)
Dept: PAIN MEDICINE | Facility: CLINIC | Age: 65
End: 2025-03-28
Payer: MEDICARE

## 2025-03-28 VITALS — HEIGHT: 71 IN | WEIGHT: 274 LBS | BODY MASS INDEX: 38.36 KG/M2

## 2025-03-28 DIAGNOSIS — G89.18 PAIN AT SURGICAL SITE: Primary | ICD-10-CM

## 2025-03-28 DIAGNOSIS — T85.733A INFECTION OF SPINAL CORD STIMULATOR, INITIAL ENCOUNTER: ICD-10-CM

## 2025-03-28 PROCEDURE — 99999 PR PBB SHADOW E&M-EST. PATIENT-LVL III: CPT | Mod: PBBFAC,,, | Performed by: STUDENT IN AN ORGANIZED HEALTH CARE EDUCATION/TRAINING PROGRAM

## 2025-03-28 NOTE — PROGRESS NOTES
"Interventional Pain Clinic    PCP:   Savage Vizcaino Jr., MD    CHIEF COMPLAINT:   Procedure follow up    HISTORY OF PRESENT ILLNESS:   Gonzalez Pollard presents for follow-up after IPG swap 11/21. He was seen last night in the ER at [facility] on my recommendation as he had increased pain around the pocket site and possibly some erythema. He was worked up extensively without overt signs of infection found. He was discharged with a prescription for clindamycin out of an abundance of caution.     Today, he reports that he has had some residual pain around the inferior IPG site since about a month after the procedure. It has come and gone. About a week and half ago, the pain returned and has stayed present. It is an aching and stinging pain he feels near the inferior aspect of the IPG when he bends over or bumps it against something. It does not radiate. He has not had any fevers, nausea, vomiting, sweats, diarrhea, or other infectious symptoms. He denies drainage from the IPG site. He has been using a heating pad over the area for relief. Nothing else.     PHYSICAL EXAMINATION  VITALS:   Vitals:    03/28/25 1433   Weight: 124.3 kg (274 lb)   Height: 5' 11" (1.803 m)   PainLoc: Back   Temperature 98.3F in the clinic     Physical Exam  Constitutional:       General: He is not in acute distress.     Appearance: Normal appearance. He is not ill-appearing, toxic-appearing or diaphoretic.   Musculoskeletal:        Back:       Comments: IPG incision well-healed   Mild erythema around incision  No dehiscence, drainage  No fluctuance or local hyperthermia  Pain with palpation of the inferior edge of the IPG  Minimal pain with palpation of the IPG otherwise   Neurological:      Mental Status: He is alert.        Finger indicates area of pain          WBC 10    NP% 68  ESR 27  Lactate 1.26    CT Lumbar spine yesterday: No abscess, subcutaneous edema, or other signs of infection    Blood cultures " pending    ASSESSMENT:   65 y.o. year old male with increased pain around his IPG site concerning for pocket pain vs latent infection. I am more suspicious for the former than the latter given how long it has been since his battery swap and his essentially negative laboratory & imaging work up performed in the ED yesterday and during our visit with the ultrasound today. Still, infection cannot be completely ruled out. I did call [facility] and speak to a staff physician in their ER to discuss the case. They will fax his full CT report over to my clinic. Additionally, they will call me tomorrow/Sunday if blood cultures come back positive.     Encounter Diagnoses   Name Primary?    Pain at surgical site Yes    Infection of spinal cord stimulator, initial encounter      PLAN:  We had a long discussion about the possibility of infected hardware and available treatment options. We discussed treatment with the Clindamycin ordered yesterday by Wally. We discussed explantation as the only definitive means to ensure no infection is present. We discussed that explantation could be done on an emergent basis today. We discussed that observation and antibiotics has a higher risk of infection spread than explantation. We discussed that spread of the infection can lead to life threatening emergencies such as epidural abscess and sepsis. After our discussion, the patient elected to pursue conservative management with antibiotics and a follow up visit on Monday. I provided him my personal number to contact me should he develop systemic/worsening symptoms. I advised him that any significant change in presentation should prompt a return to the ER. I will see him Monday.     Nikunj Reynolds MD  This note was completed with dictation software and grammatical/syntax errors may exist.

## 2025-03-31 ENCOUNTER — OFFICE VISIT (OUTPATIENT)
Dept: PAIN MEDICINE | Facility: CLINIC | Age: 65
End: 2025-03-31
Payer: MEDICARE

## 2025-03-31 ENCOUNTER — TELEPHONE (OUTPATIENT)
Dept: PAIN MEDICINE | Facility: CLINIC | Age: 65
End: 2025-03-31

## 2025-03-31 VITALS
WEIGHT: 274.06 LBS | DIASTOLIC BLOOD PRESSURE: 77 MMHG | HEIGHT: 71 IN | SYSTOLIC BLOOD PRESSURE: 157 MMHG | HEART RATE: 60 BPM | BODY MASS INDEX: 38.37 KG/M2

## 2025-03-31 DIAGNOSIS — T85.192D MALFUNCTION OF SPINAL CORD STIMULATOR, SUBSEQUENT ENCOUNTER: Primary | ICD-10-CM

## 2025-03-31 PROCEDURE — 99214 OFFICE O/P EST MOD 30 MIN: CPT | Mod: S$GLB,,, | Performed by: STUDENT IN AN ORGANIZED HEALTH CARE EDUCATION/TRAINING PROGRAM

## 2025-03-31 PROCEDURE — 99999 PR PBB SHADOW E&M-EST. PATIENT-LVL III: CPT | Mod: PBBFAC,,, | Performed by: STUDENT IN AN ORGANIZED HEALTH CARE EDUCATION/TRAINING PROGRAM

## 2025-03-31 PROCEDURE — 3077F SYST BP >= 140 MM HG: CPT | Mod: CPTII,S$GLB,, | Performed by: STUDENT IN AN ORGANIZED HEALTH CARE EDUCATION/TRAINING PROGRAM

## 2025-03-31 PROCEDURE — 3078F DIAST BP <80 MM HG: CPT | Mod: CPTII,S$GLB,, | Performed by: STUDENT IN AN ORGANIZED HEALTH CARE EDUCATION/TRAINING PROGRAM

## 2025-03-31 PROCEDURE — 3008F BODY MASS INDEX DOCD: CPT | Mod: CPTII,S$GLB,, | Performed by: STUDENT IN AN ORGANIZED HEALTH CARE EDUCATION/TRAINING PROGRAM

## 2025-03-31 PROCEDURE — 1159F MED LIST DOCD IN RCRD: CPT | Mod: CPTII,S$GLB,, | Performed by: STUDENT IN AN ORGANIZED HEALTH CARE EDUCATION/TRAINING PROGRAM

## 2025-03-31 NOTE — TELEPHONE ENCOUNTER
Types of orders made on 03/31/2025: Procedure Request      Order Date:3/31/2025   Ordering User:NIKUNJ VARGAS [[id number]]   Encounter Provider:Nikunj Vargas MD [[id number]]   Authorizing Provider: Nikunj Vargas MD [[id number]]   Department:[facility] PAIN MANAGEMENT[[id number]]      Common Order Information   Procedure -> Other (Specify location and laterality) Cmt: SCS pocket revision      Order Specific Information   Order: Procedure Request Order for Pain Management [Custom: [id number]]  Order #:          [id number]: 1 FUTURE     Priority: Routine  Class: Clinic Performed     Future Order Information       Expires on:03/31/2026            Expected by:03/31/2025                   Comment:Please expedite scheduling.     Associated Diagnoses       T85.192D Malfunction of spinal cord stimulator, subsequent encounter       Physician -> Parminder          Is patient on anti-coagulants? -> No          Facility Name: -> [facility]          Follow-up: -> 1 week              Priority: Routine  Class: Clinic Performed     Future Order Information       Expires on:03/31/2026            Expected by:03/31/2025                   Comment:Please expedite scheduling.     Associated Diagnoses       T85.192D Malfunction of spinal cord stimulator, subsequent encounter       Procedure -> Other (Specify location and laterality) Cmt: SCS pocket                 revision          Physician -> Parminder          Is patient on anti-coagulants? -> No

## 2025-03-31 NOTE — PROGRESS NOTES
"Interventional Pain Clinic    PCP:   Savage Vizcaino Jr., MD    CHIEF COMPLAINT:   IPG pain follow up    HISTORY OF PRESENT ILLNESS:   Gonzalez Pollard presents for follow-up of his IPG site pain. He was last seen on Friday for the same complaint. Over the weekend, he took the clindamycin as prescribed by the outside ER. He notes no change in his symptoms. Still does not have any fevers, N/V, sweats, or other infectious symptoms. Just the pain primarily at the inferior aspect of the IPG. No pain when seated/lying/not moving around. Pain with most movements of the trunk.  No radiation of the pain anywhere outside of the pocket site.    No change in allergies, medical history, social history, or surgical history since our last visit.    PHYSICAL EXAMINATION  VITALS:   Vitals:    03/31/25 1251   BP: (!) 157/77   Pulse: 60   Weight: 124.3 kg (274 lb 0.5 oz)   Height: 5' 11" (1.803 m)   PainSc:   8   PainLoc: Back     Physical Exam  Constitutional:       General: He is not in acute distress.     Appearance: Normal appearance. He is not ill-appearing, toxic-appearing or diaphoretic.   Musculoskeletal:        Back:       Comments: IPG incision well-healed   Mild erythema around incision  No dehiscence, drainage  No fluctuance or local hyperthermia  Pain with palpation of the inferior edge of the IPG  Minimal pain with palpation of the IPG otherwise   Neurological:      Mental Status: He is alert.           ASSESSMENT:   65 y.o. year old male with IPG pocket site pain which is intolerable. As discussed on Friday, I have low concern for infection.    Encounter Diagnosis   Name Primary?    Malfunction of spinal cord stimulator, subsequent encounter Yes     PLAN:  Continue clindamycin out of an abundance of caution  Schedule pocket revision as soon as possible  Obtain labs on Wednesday or Thursday to trend inflammatory and infectious markers which were previously negative  CT from [facility] received and will be " uploaded into chart  Advised him to contact me and present to the ER if he develops any systemic symptoms of infection (fever, nausea vomiting, chills, diaphoresis, etc.) or notices any significant change in the appearance or feeling of his pocket site.  RTC TBD by scheduling    Nikunj Reynolds MD  This note was completed with dictation software and grammatical/syntax errors may exist.

## 2025-03-31 NOTE — H&P (VIEW-ONLY)
"[facility]    PCP:   Savage Vizcaino Jr., MD    CHIEF COMPLAINT:   IPG pain follow up    HISTORY OF PRESENT ILLNESS:   Gonzalez Pollard presents for follow-up of his IPG site pain. He was last seen on Friday for the same complaint. Over the weekend, he took the clindamycin as prescribed by the outside ER. He notes no change in his symptoms. Still does not have any fevers, N/V, sweats, or other infectious symptoms. Just the pain primarily at the inferior aspect of the IPG. No pain when seated/lying/not moving around. Pain with most movements of the trunk.  No radiation of the pain anywhere outside of the pocket site.    No change in allergies, medical history, social history, or surgical history since our last visit.    PHYSICAL EXAMINATION  VITALS:   Vitals:    03/31/25 1251   BP: (!) 157/77   Pulse: 60   Weight: 124.3 kg (274 lb 0.5 oz)   Height: 5' 11" (1.803 m)   PainSc:   8   PainLoc: Back     Physical Exam  Constitutional:       General: He is not in acute distress.     Appearance: Normal appearance. He is not ill-appearing, toxic-appearing or diaphoretic.   Musculoskeletal:        Back:       Comments: IPG incision well-healed   Mild erythema around incision  No dehiscence, drainage  No fluctuance or local hyperthermia  Pain with palpation of the inferior edge of the IPG  Minimal pain with palpation of the IPG otherwise   Neurological:      Mental Status: He is alert.           ASSESSMENT:   65 y.o. year old male with IPG pocket site pain which is intolerable. As discussed on Friday, I have low concern for infection.    Encounter Diagnosis   Name Primary?    Malfunction of spinal cord stimulator, subsequent encounter Yes     PLAN:  Continue clindamycin out of an abundance of caution  Schedule pocket revision as soon as possible  Obtain labs on Wednesday or Thursday to trend inflammatory and infectious markers which were previously negative  CT from [facility] in [place] received and will be " uploaded into chart  Advised him to contact me and present to the ER if he develops any systemic symptoms of infection (fever, nausea vomiting, chills, diaphoresis, etc.) or notices any significant change in the appearance or feeling of his pocket site.  RTC TBD by scheduling    Nikunj Reynolds MD  This note was completed with dictation software and grammatical/syntax errors may exist.

## 2025-04-02 RX ORDER — MELOXICAM 7.5 MG/1
15 TABLET ORAL DAILY
COMMUNITY
Start: 2025-01-03

## 2025-04-02 RX ORDER — HYDROCODONE BITARTRATE AND ACETAMINOPHEN 5; 325 MG/1; MG/1
1 TABLET ORAL EVERY 6 HOURS
Status: ON HOLD | COMMUNITY
End: 2025-04-07

## 2025-04-02 NOTE — OR NURSING
PAT done via phone. All preop instructions reviewed with verbalized understanding. Also sent to [facility], however, patient states does not know how to access. To call with any questions.

## 2025-04-03 ENCOUNTER — ANESTHESIA EVENT (OUTPATIENT)
Dept: SURGERY | Facility: HOSPITAL | Age: 65
End: 2025-04-03
Payer: MEDICARE

## 2025-04-03 PROCEDURE — 86140 C-REACTIVE PROTEIN: CPT | Performed by: STUDENT IN AN ORGANIZED HEALTH CARE EDUCATION/TRAINING PROGRAM

## 2025-04-03 PROCEDURE — 85651 RBC SED RATE NONAUTOMATED: CPT | Performed by: STUDENT IN AN ORGANIZED HEALTH CARE EDUCATION/TRAINING PROGRAM

## 2025-04-03 PROCEDURE — 85025 COMPLETE CBC W/AUTO DIFF WBC: CPT | Performed by: STUDENT IN AN ORGANIZED HEALTH CARE EDUCATION/TRAINING PROGRAM

## 2025-04-03 PROCEDURE — 80048 BASIC METABOLIC PNL TOTAL CA: CPT | Performed by: STUDENT IN AN ORGANIZED HEALTH CARE EDUCATION/TRAINING PROGRAM

## 2025-04-07 ENCOUNTER — HOSPITAL ENCOUNTER (OUTPATIENT)
Facility: HOSPITAL | Age: 65
Discharge: HOME | End: 2025-04-07
Attending: STUDENT IN AN ORGANIZED HEALTH CARE EDUCATION/TRAINING PROGRAM | Admitting: STUDENT IN AN ORGANIZED HEALTH CARE EDUCATION/TRAINING PROGRAM
Payer: MEDICARE

## 2025-04-07 ENCOUNTER — ANESTHESIA (OUTPATIENT)
Dept: SURGERY | Facility: HOSPITAL | Age: 65
End: 2025-04-07
Payer: MEDICARE

## 2025-04-07 VITALS
RESPIRATION RATE: 16 BRPM | OXYGEN SATURATION: 96 % | BODY MASS INDEX: 38.22 KG/M2 | SYSTOLIC BLOOD PRESSURE: 157 MMHG | DIASTOLIC BLOOD PRESSURE: 77 MMHG | WEIGHT: 274 LBS | HEART RATE: 63 BPM | TEMPERATURE: 98 F

## 2025-04-07 DIAGNOSIS — Z01.818 PREOP TESTING: Primary | ICD-10-CM

## 2025-04-07 DIAGNOSIS — T85.192S MALFUNCTION OF SPINAL CORD STIMULATOR, SEQUELA: ICD-10-CM

## 2025-04-07 DIAGNOSIS — T85.192A SPINAL CORD STIMULATOR DYSFUNCTION: ICD-10-CM

## 2025-04-07 PROCEDURE — 71000033 HC RECOVERY, INTIAL HOUR: Performed by: STUDENT IN AN ORGANIZED HEALTH CARE EDUCATION/TRAINING PROGRAM

## 2025-04-07 PROCEDURE — 63600175 PHARM REV CODE 636 W HCPCS: Performed by: NURSE ANESTHETIST, CERTIFIED REGISTERED

## 2025-04-07 PROCEDURE — 37000009 HC ANESTHESIA EA ADD 15 MINS: Performed by: STUDENT IN AN ORGANIZED HEALTH CARE EDUCATION/TRAINING PROGRAM

## 2025-04-07 PROCEDURE — 36000706: Performed by: STUDENT IN AN ORGANIZED HEALTH CARE EDUCATION/TRAINING PROGRAM

## 2025-04-07 PROCEDURE — 25000003 PHARM REV CODE 250: Performed by: ANESTHESIOLOGY

## 2025-04-07 PROCEDURE — 27201423 OPTIME MED/SURG SUP & DEVICES STERILE SUPPLY: Performed by: STUDENT IN AN ORGANIZED HEALTH CARE EDUCATION/TRAINING PROGRAM

## 2025-04-07 PROCEDURE — 63663 REVISE SPINE ELTRD PERQ ARAY: CPT | Mod: ,,, | Performed by: STUDENT IN AN ORGANIZED HEALTH CARE EDUCATION/TRAINING PROGRAM

## 2025-04-07 PROCEDURE — 37000008 HC ANESTHESIA 1ST 15 MINUTES: Performed by: STUDENT IN AN ORGANIZED HEALTH CARE EDUCATION/TRAINING PROGRAM

## 2025-04-07 PROCEDURE — 63600175 PHARM REV CODE 636 W HCPCS: Performed by: ANESTHESIOLOGY

## 2025-04-07 PROCEDURE — 71000039 HC RECOVERY, EACH ADD'L HOUR: Performed by: STUDENT IN AN ORGANIZED HEALTH CARE EDUCATION/TRAINING PROGRAM

## 2025-04-07 PROCEDURE — 25000003 PHARM REV CODE 250: Performed by: NURSE ANESTHETIST, CERTIFIED REGISTERED

## 2025-04-07 PROCEDURE — 63600175 PHARM REV CODE 636 W HCPCS: Performed by: STUDENT IN AN ORGANIZED HEALTH CARE EDUCATION/TRAINING PROGRAM

## 2025-04-07 PROCEDURE — 94799 UNLISTED PULMONARY SVC/PX: CPT | Mod: XB

## 2025-04-07 PROCEDURE — 71000015 HC POSTOP RECOV 1ST HR: Performed by: STUDENT IN AN ORGANIZED HEALTH CARE EDUCATION/TRAINING PROGRAM

## 2025-04-07 PROCEDURE — 36000707: Performed by: STUDENT IN AN ORGANIZED HEALTH CARE EDUCATION/TRAINING PROGRAM

## 2025-04-07 RX ORDER — OXYCODONE HYDROCHLORIDE 5 MG/1
5 TABLET ORAL ONCE AS NEEDED
Status: COMPLETED | OUTPATIENT
Start: 2025-04-07 | End: 2025-04-07

## 2025-04-07 RX ORDER — ONDANSETRON HYDROCHLORIDE 2 MG/ML
INJECTION, SOLUTION INTRAVENOUS
Status: DISCONTINUED | OUTPATIENT
Start: 2025-04-07 | End: 2025-04-07

## 2025-04-07 RX ORDER — DIPHENHYDRAMINE HYDROCHLORIDE 50 MG/ML
INJECTION, SOLUTION INTRAMUSCULAR; INTRAVENOUS
Status: DISCONTINUED | OUTPATIENT
Start: 2025-04-07 | End: 2025-04-07

## 2025-04-07 RX ORDER — DEXMEDETOMIDINE HYDROCHLORIDE 100 UG/ML
INJECTION, SOLUTION INTRAVENOUS
Status: DISCONTINUED | OUTPATIENT
Start: 2025-04-07 | End: 2025-04-07

## 2025-04-07 RX ORDER — KETAMINE HCL IN 0.9 % NACL 50 MG/5 ML
SYRINGE (ML) INTRAVENOUS
Status: DISCONTINUED | OUTPATIENT
Start: 2025-04-07 | End: 2025-04-07

## 2025-04-07 RX ORDER — FENTANYL CITRATE 50 UG/ML
25 INJECTION, SOLUTION INTRAMUSCULAR; INTRAVENOUS EVERY 5 MIN PRN
Status: DISCONTINUED | OUTPATIENT
Start: 2025-04-07 | End: 2025-04-07 | Stop reason: HOSPADM

## 2025-04-07 RX ORDER — LIDOCAINE HYDROCHLORIDE 10 MG/ML
1 INJECTION, SOLUTION EPIDURAL; INFILTRATION; INTRACAUDAL; PERINEURAL ONCE
Status: DISCONTINUED | OUTPATIENT
Start: 2025-04-07 | End: 2025-04-07 | Stop reason: HOSPADM

## 2025-04-07 RX ORDER — CLINDAMYCIN PHOSPHATE 900 MG/50ML
900 INJECTION, SOLUTION INTRAVENOUS
Status: COMPLETED | OUTPATIENT
Start: 2025-04-07 | End: 2025-04-07

## 2025-04-07 RX ORDER — ACETAMINOPHEN 10 MG/ML
INJECTION, SOLUTION INTRAVENOUS
Status: DISCONTINUED | OUTPATIENT
Start: 2025-04-07 | End: 2025-04-07

## 2025-04-07 RX ORDER — VANCOMYCIN HYDROCHLORIDE 1 G/20ML
INJECTION, POWDER, LYOPHILIZED, FOR SOLUTION INTRAVENOUS
Status: DISCONTINUED | OUTPATIENT
Start: 2025-04-07 | End: 2025-04-07 | Stop reason: HOSPADM

## 2025-04-07 RX ORDER — FENTANYL CITRATE 50 UG/ML
INJECTION, SOLUTION INTRAMUSCULAR; INTRAVENOUS
Status: DISCONTINUED | OUTPATIENT
Start: 2025-04-07 | End: 2025-04-07

## 2025-04-07 RX ORDER — DEXAMETHASONE SODIUM PHOSPHATE 4 MG/ML
INJECTION, SOLUTION INTRA-ARTICULAR; INTRALESIONAL; INTRAMUSCULAR; INTRAVENOUS; SOFT TISSUE
Status: DISCONTINUED | OUTPATIENT
Start: 2025-04-07 | End: 2025-04-07

## 2025-04-07 RX ORDER — HYDROCODONE BITARTRATE AND ACETAMINOPHEN 5; 325 MG/1; MG/1
1 TABLET ORAL EVERY 6 HOURS PRN
Qty: 21 TABLET | Refills: 0 | Status: SHIPPED | OUTPATIENT
Start: 2025-04-07

## 2025-04-07 RX ORDER — MIDAZOLAM HYDROCHLORIDE 1 MG/ML
INJECTION INTRAMUSCULAR; INTRAVENOUS
Status: DISCONTINUED | OUTPATIENT
Start: 2025-04-07 | End: 2025-04-07

## 2025-04-07 RX ORDER — SODIUM CHLORIDE, SODIUM LACTATE, POTASSIUM CHLORIDE, CALCIUM CHLORIDE 600; 310; 30; 20 MG/100ML; MG/100ML; MG/100ML; MG/100ML
INJECTION, SOLUTION INTRAVENOUS CONTINUOUS
Status: DISCONTINUED | OUTPATIENT
Start: 2025-04-07 | End: 2025-04-07 | Stop reason: HOSPADM

## 2025-04-07 RX ORDER — ONDANSETRON HYDROCHLORIDE 2 MG/ML
4 INJECTION, SOLUTION INTRAVENOUS ONCE AS NEEDED
Status: DISCONTINUED | OUTPATIENT
Start: 2025-04-07 | End: 2025-04-07 | Stop reason: HOSPADM

## 2025-04-07 RX ADMIN — Medication 10 MG: at 08:04

## 2025-04-07 RX ADMIN — CLINDAMYCIN IN 5 PERCENT DEXTROSE 900 MG: 18 INJECTION, SOLUTION INTRAVENOUS at 07:04

## 2025-04-07 RX ADMIN — MIDAZOLAM HYDROCHLORIDE 2 MG: 1 INJECTION INTRAMUSCULAR; INTRAVENOUS at 08:04

## 2025-04-07 RX ADMIN — FENTANYL CITRATE 25 MCG: 50 INJECTION, SOLUTION INTRAMUSCULAR; INTRAVENOUS at 08:04

## 2025-04-07 RX ADMIN — SODIUM CHLORIDE, SODIUM GLUCONATE, SODIUM ACETATE, POTASSIUM CHLORIDE AND MAGNESIUM CHLORIDE: 526; 502; 368; 37; 30 INJECTION, SOLUTION INTRAVENOUS at 10:04

## 2025-04-07 RX ADMIN — DEXAMETHASONE SODIUM PHOSPHATE 4 MG: 4 INJECTION, SOLUTION INTRA-ARTICULAR; INTRALESIONAL; INTRAMUSCULAR; INTRAVENOUS; SOFT TISSUE at 08:04

## 2025-04-07 RX ADMIN — DEXMEDETOMIDINE HYDROCHLORIDE 4 MCG: 100 INJECTION, SOLUTION INTRAVENOUS at 08:04

## 2025-04-07 RX ADMIN — OXYCODONE 5 MG: 5 TABLET ORAL at 10:04

## 2025-04-07 RX ADMIN — ONDANSETRON 4 MG: 2 INJECTION INTRAMUSCULAR; INTRAVENOUS at 08:04

## 2025-04-07 RX ADMIN — SODIUM CHLORIDE, SODIUM GLUCONATE, SODIUM ACETATE, POTASSIUM CHLORIDE AND MAGNESIUM CHLORIDE: 526; 502; 368; 37; 30 INJECTION, SOLUTION INTRAVENOUS at 07:04

## 2025-04-07 RX ADMIN — FENTANYL CITRATE 25 MCG: 50 INJECTION, SOLUTION INTRAMUSCULAR; INTRAVENOUS at 11:04

## 2025-04-07 RX ADMIN — FENTANYL CITRATE 25 MCG: 50 INJECTION, SOLUTION INTRAMUSCULAR; INTRAVENOUS at 10:04

## 2025-04-07 RX ADMIN — MIDAZOLAM HYDROCHLORIDE 1 MG: 1 INJECTION INTRAMUSCULAR; INTRAVENOUS at 09:04

## 2025-04-07 RX ADMIN — DIPHENHYDRAMINE HYDROCHLORIDE 12.5 MG: 50 INJECTION INTRAMUSCULAR; INTRAVENOUS at 08:04

## 2025-04-07 RX ADMIN — DEXMEDETOMIDINE HYDROCHLORIDE 4 MCG: 100 INJECTION, SOLUTION INTRAVENOUS at 09:04

## 2025-04-07 RX ADMIN — GLYCOPYRROLATE 0.2 MG: 0.2 INJECTION, SOLUTION INTRAMUSCULAR; INTRAVITREAL at 08:04

## 2025-04-07 RX ADMIN — ACETAMINOPHEN 1000 MG: 10 INJECTION INTRAVENOUS at 08:04

## 2025-04-07 RX ADMIN — Medication 20 MG: at 08:04

## 2025-04-07 RX ADMIN — MIDAZOLAM HYDROCHLORIDE 1 MG: 1 INJECTION INTRAMUSCULAR; INTRAVENOUS at 08:04

## 2025-04-07 NOTE — TRANSFER OF CARE
Anesthesia Transfer of Care Note    Patient: Gonzalez Pollard    Procedure(s) Performed: Procedure(s) (LRB):  REMOVAL, NEUROSTIMULATOR, SPINAL (N/A)    Patient location: PACU    Anesthesia Type: MAC    Transport from OR: Transported from OR on 2-3 L/min O2 by NC with adequate spontaneous ventilation    Post pain: adequate analgesia    Post assessment: no apparent anesthetic complications and tolerated procedure well    Post vital signs: stable    Level of consciousness: awake and alert    Nausea/Vomiting: no nausea/vomiting    Complications: none    Transfer of care protocol was followed      Last vitals: Visit Vitals  /65   Pulse (!) 54   Temp 36.4 °C (97.5 °F) (Skin)   Resp 16   Wt 124.3 kg (274 lb)   SpO2 95%   BMI 38.22 kg/m²

## 2025-04-07 NOTE — PLAN OF CARE
Patient awake, alert, oriented. Vital signs stable. Patient verbalizes readiness for discharge. Discharge instructions reviewed with patient and patient's spouse. Patient and patient's spouse verbalized understanding. IV removed, catheter intact. Dressing to back clean, dry, and intact. All belongings returned to patient. Patient transferred to car via wheelchair per ran Cisse. Safety maintained.

## 2025-04-07 NOTE — DISCHARGE INSTRUCTIONS
"Discharge Instructions: After Your Surgery/Procedure  Youve just had surgery. During surgery you were given medicine called anesthesia to keep you relaxed and free of pain. After surgery you may have some pain or nausea. This is common. Here are some tips for feeling better and getting well after surgery.     Stay on schedule with your medication.   Going home  Your doctor or nurse will show you how to take care of yourself when you go home. He or she will also answer your questions. Have an adult family member or friend drive you home.      For your safety we recommend these precaution for the first 24 hours after your procedure:  Do not drive or use heavy equipment.  Do not make important decisions or sign legal papers.  Do not drink alcohol.  Have someone stay with you, if needed. He or she can watch for problems and help keep you safe.  Your concentration, balance, coordination, and judgement may be impaired for many hours after anesthesia.  Use caution when ambulating or standing up.     You may feel weak and "washed out" after anesthesia and surgery.      Subtle residual effects of general anesthesia or sedation with regional / local anesthesia can last more than 24 hours.  Rest for the remainder of the day or longer if your Doctor/Surgeon has advised you to do so.  Although you may feel normal within the first 24 hours, your reflexes and mental ability may be impaired without you realizing it.  You may feel dizzy, lightheaded or sleepy for 24 hours or longer.      Be sure to go to all follow-up visits with your doctor. And rest after your surgery for as long as your doctor tells you to.  Coping with pain  If you have pain after surgery, pain medicine will help you feel better. Take it as told, before pain becomes severe. Also, ask your doctor or pharmacist about other ways to control pain. This might be with heat, ice, or relaxation. And follow any other instructions your surgeon or nurse gives you.  Tips " for taking pain medicine  To get the best relief possible, remember these points:  Pain medicines can upset your stomach. Taking them with a little food may help.  Most pain relievers taken by mouth need at least 20 to 30 minutes to start to work.  Taking medicine on a schedule can help you remember to take it. Try to time your medicine so that you can take it before starting an activity. This might be before you get dressed, go for a walk, or sit down for dinner.  Constipation is a common side effect of pain medicines. Call your doctor before taking any medicines such as laxatives or stool softeners to help ease constipation. Also ask if you should skip any foods. Drinking lots of fluids and eating foods such as fruits and vegetables that are high in fiber can also help. Remember, do not take laxatives unless your surgeon has prescribed them.  Drinking alcohol and taking pain medicine can cause dizziness and slow your breathing. It can even be deadly. Do not drink alcohol while taking pain medicine.  Pain medicine can make you react more slowly to things. Do not drive or run machinery while taking pain medicine.  Your health care provider may tell you to take acetaminophen to help ease your pain. Ask him or her how much you are supposed to take each day. Acetaminophen or other pain relievers may interact with your prescription medicines or other over-the-counter (OTC) drugs. Some prescription medicines have acetaminophen and other ingredients. Using both prescription and OTC acetaminophen for pain can cause you to overdose. Read the labels on your OTC medicines with care. This will help you to clearly know the list of ingredients, how much to take, and any warnings. It may also help you not take too much acetaminophen. If you have questions or do not understand the information, ask your pharmacist or health care provider to explain it to you before you take the OTC medicine.  Managing nausea  Some people have an  upset stomach after surgery. This is often because of anesthesia, pain, or pain medicine, or the stress of surgery. These tips will help you handle nausea and eat healthy foods as you get better. If you were on a special food plan before surgery, ask your doctor if you should follow it while you get better. These tips may help:  Do not push yourself to eat. Your body will tell you when to eat and how much.  Start off with clear liquids and soup. They are easier to digest.  Next try semi-solid foods, such as mashed potatoes, applesauce, and gelatin, as you feel ready.  Slowly move to solid foods. Dont eat fatty, rich, or spicy foods at first.  Do not force yourself to have 3 large meals a day. Instead eat smaller amounts more often.  Take pain medicines with a small amount of solid food, such as crackers or toast, to avoid nausea.     Call your surgeon if  You still have pain an hour after taking medicine. The medicine may not be strong enough.  You feel too sleepy, dizzy, or groggy. The medicine may be too strong.  You have side effects like nausea, vomiting, or skin changes, such as rash, itching, or hives.       If you have obstructive sleep apnea  You were given anesthesia medicine during surgery to keep you comfortable and free of pain. After surgery, you may have more apnea spells because of this medicine and other medicines you were given. The spells may last longer than usual.   At home:  Keep using the continuous positive airway pressure (CPAP) device when you sleep. Unless your health care provider tells you not to, use it when you sleep, day or night. CPAP is a common device used to treat obstructive sleep apnea.  Talk with your provider before taking any pain medicine, muscle relaxants, or sedatives. Your provider will tell you about the possible dangers of taking these medicines.  © 2512-1401 [employer] [address] [place] [address], [place] [address]. All rights reserved. This information is  not intended as a substitute for professional medical care. Always follow your healthcare professional's instructions.          Using an Incentive Spirometer    An incentive spirometer is a device that helps you do deep breathing exercises. These exercises expand your lungs, aid in circulation, and help prevent pneumonia. Deep breathing exercises also help you breathe better and improve the function of your lungs by:  Keeping your lungs clear  Strengthening your breathing muscles  Helping prevent respiratory complications or problems  The incentive spirometer gives you a way to take an active part in recover. A nurse or therapist will teach you breathing exercises. To do these exercises, you will breathe in through your mouth and not your nose. The incentive spirometer only works correctly if you breathe in through your mouth.  Steps to clear lungs  Step 1. Exhale normally. Then, inhale normally.  Relax and breathe out.  Step 2. Place your lips tightly around the mouthpiece.  Make sure the device is upright and not tilted.  Step 3. Inhale as much air as you can through the mouthpiece (don't breath through your nose).  Inhale slowly and deeply.  Hold your breath long enough to keep the balls or disk raised for at least 3 to 5 seconds, or as instructed by your healthcare provider.  Some spirometers have an indicator to let you know that you are breathing in too fast. If the indicator goes off, breathe in more slowly.  Step 4. Repeat the exercise regularly.  Do this exercise every hour while you're awake, or as instructed by your healthcare provider.  If you were taught deep breathing and coughing exercises, do them regularly as instructed by your healthcare provider.           Post op instructions for prevention of DVT  What is deep vein thrombosis?  Deep vein thrombosis (DVT) is the medical term for blood clots in the deep veins of the leg.  These blood clots can be dangerous.  A DVT can block a blood vessel and keep  blood from getting where it needs to go.  Another problem is that the clot can travel to other parts of the body such as the lungs.  A clot that travels to the lungs is called a pulmonary embolus (PE) and can cause serious problems with breathing which can lead to death.  Am I at risk for DVT/PE?  If you are not very active, you are at risk of DVT.  Anyone confined to bed, sitting for long periods of time, recovering from surgery, etc. increases the risk of DVT.  Other risk factors are cancer diagnosis, certain medications, estrogen replacement in any form,older age, obesity, pregnancy, smoking, history of clotting disorders, and dehydration.  How will I know if I have a DVT?  Swelling in the lower leg  Pain  Warmth, redness, hardness or bulging of the vein  If you have any of these symptoms, call your doctors office right away.  Some people will not have any symptoms until the clot moves to the lungs.  What are the symptoms of a PE?  Panting, shortness of breath, or trouble breathing  Sharp, knife-like chest pain when you breathe  Coughing or coughing up blood  Rapid heartbeat  If you have any of these symptoms or get worse quickly, call 911 for emergency treatment.  How can I prevent a DVT?  Avoid long periods of inactivity and dont cross your legs--get up and walk around every hour or so.  Stay active--walking after surgery is highly encouraged.  This means you should get out of the house and walk in the neighborhood.  Going up and down stairs will not impair healing (unless advised against such activity by your doctor).    Drink plenty of noncaffeinated, nonalcoholic fluids each day to prevent dehydration.  Wear special support stockings, if they have been advised by your doctor.  If you travel, stop at least once an hour and walk around.  Avoid smoking (assistance with stopping is available through your healthcare provider)  Always notify your doctor if you are not able to follow the post operative  instructions that are given to you at the time of discharge.  It may be necessary to prescribe one of the medications available to prevent DVT.          We hope your stay was comfortable as you heal now, mend and rest.    For we have enjoyed taking care of you by giving your our best.    And as you get better, by regaining your health and strength;   We count it as a privilege to have served you and hope your time at [facility] was well spent.      Thank  You!!!

## 2025-04-07 NOTE — PLAN OF CARE
Released per anesthesia when criteria met, pain controlled skin warm and dry. No nausea, emesis, dressing dry and intact. Encourage deep breaths. Instructed on IS, encourage use. Patient has all belongings in post op.

## 2025-04-07 NOTE — OP NOTE
PROCEDURE DATE: 4/7/2025    PROCEDURE:   1. Spinal Cord Stimulator IPG pocket revision    Pre-op diagnosis:  1. Spinal cord stimulator malfunction, sequela  2. Surgical site pain    Post-op diagnosis: Same    Surgeon: Nikunj Reynolds MD    Assistant: none     Anesthesia: MAC per CRNA    CONSENT: The risks, benefits, and options were discussed thoroughly with the patient. The patient's questions were answered. The patient understands the risk and benefits and wishes to proceed. Informed consent was obtained.     TECHNIQUE:  Site of the implantable pulse generator was marked on the patients left side in the preoperative area. The patient was taken to the OR and placed in the prone position. His lumbar region was shaved. The anesthesia provider throughout the case provided sedation and cardiopulmonary monitoring. All activities were ceased in the OR and a time out was performed with all participating individuals.     The Patient's back draped with 10X10s and prepped with Chlorhexidine. It was again prepped with Chlorhexidine and then draped in usual sterile fashion. Fluoroscopy was used to confirm the site of the IPG. The site of the incision was marked. 20 mL of  Lidocaine 1% with epinephrine was injected at the site of incision using a 22G 1.5 inch needle.  A No. 10 scalpel was then used to create an incision approximately 6 cm in length over the previous IPG incision. The tissue was lifted up away from the IPG and blunt dissection was used to enter into the pocket. The battery was accessed and removed from pocket still attached to leads. There were no signs of infection: no fluid accumulation, purulence, foul odor, adhesions, fistulas, or otherwise. The leads were disconnected from the battery. They were marked to keep their placement congruent later in the case. The battery was then scrubbed and placed in a solution of sterile saline and vancomycin powder for 15 minutes. The site for the new IPG pocket was  chosen using an IPG template and fluoroscopy below the lowest rib and above the iliac crest. This was marked with a surgical pen. 20 mL of  Lidocaine 1% with epinephrine was then injected at the site of incision using a 22G 1.5 inch needle.  A No. 10 scalpel was then used to create an incision approximately 5 cm in length. Blunt dissection was carried out inferiorly from the incision until an appropriate-sized pocket was made at approximately 0.5-1 inch depth. There was no difficulty achieving hemostasis. A 14G Tuohy needle was then used to tunnel the leads laterally and slightly superiorly from the old pocket to the new pocket. Both pockets were then copiously irrigated with sterile saline. The leads were reconnected to the IPG, and it was placed into the pocket with the logo facing up and a stress loop underneath the device. Then the system was checked by device representative in sterile fashion and was found to be functioning correctly. The pockets were checked closely and hemostasis was confirmed. The pockets were again copiously irrigated with sterile saline. The IPG was found to be seated appropriately within the pocket with sufficient tissue for closure. Fluoroscopy was used to confirm placement in the intended site. A 2-0 silk suture was used in the old IPG pocket to tack down the midportion. Then the IPG pockets were closed with simple interrupted 2-0 Vicryl sutures followed by a running 4-0 Monocryl dermal suture. Dermabond and steristrips were placed over the two incisions. Sterile 4x4s and paper tape was then used to apply a gentle pressure dressing to each site. Sponge and needle counts were correct x2 at conclusion of the case. The site of the IPG and the location of the leads within the epidural space was again confirmed on fluoroscopy and remained appropriate.     Patient was then placed supine on the stretcher and transferred to the recovery room. The SCS system was confirmed to be functioning  normally by the device representative. Patient was instructed not to perform any abrupt movements with the back, avoid bending, twisting, or lifting >10lbs for the next 2 weeks. The patient has been scheduled to return to the clinic in approx 5-7 days. The patient tolerated the procedure well. Electrocautery was not used in this case.     Estimated Blood Loss: Minimal <10ml    Urine Output: Not Measured    IV Fluids- See Anesthesia record    Complications: None    Nikunj Reynolds MD  4/7/2025

## 2025-04-07 NOTE — ANESTHESIA POSTPROCEDURE EVALUATION
Anesthesia Post Evaluation    Patient: Gonzalez Pollard    Procedure(s) Performed: Procedure(s) (LRB):  REVISION, PROCEDURE INVOLVING SPINAL CORD NEUROSTIMULATOR (N/A)    Final Anesthesia Type: MAC      Patient location during evaluation: PACU  Patient participation: Yes- Able to Participate  Level of consciousness: awake and alert  Post-procedure vital signs: reviewed and stable  Pain management: adequate  Airway patency: patent    PONV status at discharge: No PONV  Anesthetic complications: no      Cardiovascular status: blood pressure returned to baseline  Respiratory status: unassisted  Hydration status: euvolemic  Follow-up not needed.              Vitals Value Taken Time   /77 04/07/25 11:40   Temp 36.7 °C (98 °F) 04/07/25 09:58   Pulse 63 04/07/25 11:40   Resp 16 04/07/25 11:40   SpO2 96 % 04/07/25 11:40         Event Time   Out of Recovery 11:02:00         Pain/Marilee Score: Pain Rating Prior to Med Admin: 6 (4/7/2025 11:07 AM)  Pain Rating Post Med Admin: 4 (4/7/2025 11:02 AM)  Marilee Score: 10 (4/7/2025 11:02 AM)  Modified Marilee Score: 19 (4/7/2025 11:50 AM)

## 2025-04-07 NOTE — ANESTHESIA PREPROCEDURE EVALUATION
04/07/2025  Gonzalez Pollard is a 65 y.o., male.      Pre-op Assessment    I have reviewed the Patient Summary Reports.    I have reviewed the NPO Status.   I have reviewed the Medications.     Review of Systems  Anesthesia Hx:  No problems with previous Anesthesia                Social:  Non-Smoker       Cardiovascular:  Cardiovascular Normal                                              Pulmonary:        Sleep Apnea     Obstructive Sleep Apnea (ANN).           Renal/:    BPH              Musculoskeletal:         Spine Disorders:  Chronic Pain           Neurological:   CVA        S/P SCS      Peripheral Neuropathy             CVA - Cerebrovasular Accident                 Endocrine:  Diabetes, well controlled, type 2    Diabetes                    Obesity / BMI > 30      Physical Exam  General: Well nourished    Airway:  Mallampati: II   Mouth Opening: Normal  TM Distance: Normal  Neck ROM: Normal ROM        Anesthesia Plan  Type of Anesthesia, risks & benefits discussed:    Anesthesia Type: MAC  Intra-op Monitoring Plan: Standard ASA Monitors  Post Op Pain Control Plan: multimodal analgesia  Induction:  IV  Informed Consent: Informed consent signed with the Patient and all parties understand the risks and agree with anesthesia plan.  All questions answered.   ASA Score: 2    Ready For Surgery From Anesthesia Perspective.     .

## 2025-04-07 NOTE — DISCHARGE SUMMARY
[facility]  Discharge Note  Short Stay     Admit Date: 4/7/2025    Discharge Date: 4/7/2025     Attending Physician: Nikunj Reynolds MD    Diagnoses:  Spinal cord stimulator dysfunction  IPG pocket pain/surgical site pain    Discharged Condition: Good     Hospital Course: Patient was admitted for an outpatient interventional pain management procedure and tolerated the procedure well with no complications.     Final Diagnoses: Same as principal problem.     Disposition: Home or Self Care     Follow up/Patient Instructions:   Follow-up in 4 weeks unless otherwise instructed. May return sooner as needed.       Reconciled Medications:     Medication List        CONTINUE taking these medications      aspirin 81 MG EC tablet  Commonly known as: ECOTRIN  Take 81 mg by mouth.     atorvastatin 40 MG tablet  Commonly known as: LIPITOR  Take 1 tablet by mouth once daily.     DULoxetine 60 MG capsule  Commonly known as: CYMBALTA  Take 60 mg by mouth once daily.     gabapentin 600 MG tablet  Commonly known as: NEURONTIN  Take 600 mg by mouth.     HYDROcodone-acetaminophen 5-325 mg per tablet  Commonly known as: NORCO  Take 1 tablet by mouth every 6 (six) hours.     meloxicam 7.5 MG tablet  Commonly known as: MOBIC  Take 15 mg by mouth once daily.     tamsulosin 0.4 mg Cap  Commonly known as: FLOMAX  Take 0.4 mg by mouth once daily.     traZODone 150 MG tablet  Commonly known as: DESYREL  Take 1 tablet by mouth nightly as needed.            ASK your doctor about these medications      metFORMIN 500 MG ER 24hr tablet  Commonly known as: GLUCOPHAGE-XR  Take 500 mg by mouth Daily.             Discharge Procedure Orders (must include Diet, Follow-up, Activity)   Ice to affected area   Order Comments: 20 minutes of ice or until area numb to the touch if area is sore 2-3 times per day as needed     No driving until:   Order Comments: Until following day     No dressing needed     Notify your health care provider if you  experience any of the following:  temperature >100.4     Notify your health care provider if you experience any of the following:  persistent nausea and vomiting or diarrhea     Notify your health care provider if you experience any of the following:  severe uncontrolled pain     Notify your health care provider if you experience any of the following:  redness, tenderness, or signs of infection (pain, swelling, redness, odor or green/yellow discharge around incision site)     Notify your health care provider if you experience any of the following:  difficulty breathing or increased cough     Notify your health care provider if you experience any of the following:  severe persistent headache     Notify your health care provider if you experience any of the following:  worsening rash     Notify your health care provider if you experience any of the following:  persistent dizziness, light-headedness, or visual disturbances     Notify your health care provider if you experience any of the following:  increased confusion or weakness     Ice to affected area   Order Comments: 20 minutes of ice or until area numb to the touch if area is sore 2-3 times per day as needed     No driving until:   Order Comments: Until following day     Notify your health care provider if you experience any of the following:  temperature >100.4     Notify your health care provider if you experience any of the following:  persistent nausea and vomiting or diarrhea     Notify your health care provider if you experience any of the following:  severe uncontrolled pain     Notify your health care provider if you experience any of the following:  redness, tenderness, or signs of infection (pain, swelling, redness, odor or green/yellow discharge around incision site)     Notify your health care provider if you experience any of the following:  difficulty breathing or increased cough     Notify your health care provider if you experience any of the  following:  severe persistent headache     Notify your health care provider if you experience any of the following:  worsening rash     Notify your health care provider if you experience any of the following:  persistent dizziness, light-headedness, or visual disturbances     Notify your health care provider if you experience any of the following:  increased confusion or weakness     Leave dressing on - Keep it clean, dry, and intact until clinic visit     Shower on day dressing removed (No bath)       Nikunj Reynolds MD  Interventional Pain Medicine / Physical Medicine & Rehabilitation

## 2025-04-14 ENCOUNTER — OFFICE VISIT (OUTPATIENT)
Dept: PAIN MEDICINE | Facility: CLINIC | Age: 65
End: 2025-04-14
Payer: MEDICARE

## 2025-04-14 VITALS — WEIGHT: 274 LBS | BODY MASS INDEX: 38.36 KG/M2 | HEIGHT: 71 IN

## 2025-04-14 DIAGNOSIS — T85.192D MALFUNCTION OF SPINAL CORD STIMULATOR, SUBSEQUENT ENCOUNTER: Primary | ICD-10-CM

## 2025-04-14 PROCEDURE — 99999 PR PBB SHADOW E&M-EST. PATIENT-LVL III: CPT | Mod: PBBFAC,,, | Performed by: STUDENT IN AN ORGANIZED HEALTH CARE EDUCATION/TRAINING PROGRAM

## 2025-04-14 PROCEDURE — 99024 POSTOP FOLLOW-UP VISIT: CPT | Mod: S$GLB,,, | Performed by: STUDENT IN AN ORGANIZED HEALTH CARE EDUCATION/TRAINING PROGRAM

## 2025-04-14 PROCEDURE — 3008F BODY MASS INDEX DOCD: CPT | Mod: CPTII,S$GLB,, | Performed by: STUDENT IN AN ORGANIZED HEALTH CARE EDUCATION/TRAINING PROGRAM

## 2025-04-14 PROCEDURE — 1159F MED LIST DOCD IN RCRD: CPT | Mod: CPTII,S$GLB,, | Performed by: STUDENT IN AN ORGANIZED HEALTH CARE EDUCATION/TRAINING PROGRAM

## 2025-04-14 NOTE — PROGRESS NOTES
"[facility]    PCP:   Savage Vizcaino Jr., MD (Inactive)    CHIEF COMPLAINT:   Procedure follow up    HISTORY OF PRESENT ILLNESS:   Gonzalez Pollard presents for follow-up after IPG pocket revision last Monday.  He has no questions or concerns.  Has not had any issues with the new or old IPG site.    No change in allergies, medical history, social history, or surgical history since our procedure.    PHYSICAL EXAMINATION  VITALS:   Vitals:    04/14/25 1359   Weight: 124.3 kg (274 lb)   Height: 5' 11" (1.803 m)   PainSc:   3   PainLoc: Back     Physical Exam  Constitutional:       Appearance: Normal appearance.   HENT:      Head: Normocephalic.   Cardiovascular:      Rate and Rhythm: Normal rate.   Pulmonary:      Effort: Pulmonary effort is normal.   Musculoskeletal:      Right lower leg: No edema.      Left lower leg: No edema.   Skin:     General: Skin is warm and dry.             Comments: Surgical dressings removed   Incisions are clean, dry, and intact   No erythema   No dehiscence   No drainage  No fluctuance  No particular pain with palpation   Neurological:      Mental Status: He is alert and oriented to person, place, and time. Mental status is at baseline.   Psychiatric:         Mood and Affect: Mood normal.         Behavior: Behavior normal.       ASSESSMENT:   65 y.o. year old male with routine healing after IPG pocket revision    Encounter Diagnosis   Name Primary?    Malfunction of spinal cord stimulator, subsequent encounter Yes     PLAN:  He is cleared to shower and to return to work.  Discussed limiting rotation and deep lumbar flexion for another couple of weeks while these surgical sites continue to heal.    Follow up p.r.n.      Nikunj Reynolds MD  This note was completed with dictation software and grammatical/syntax errors may exist.    "

## 2025-05-13 ENCOUNTER — TELEPHONE (OUTPATIENT)
Dept: PAIN MEDICINE | Facility: CLINIC | Age: 65
End: 2025-05-13
Payer: MEDICARE

## 2025-05-13 NOTE — TELEPHONE ENCOUNTER
----- Message from Nikunj Reynolds MD sent at 5/13/2025 12:47 PM CDT -----  Regarding: F/U  Please call Mr. Pollard and make a follow up visit some time next week. I was not able to get through to him on the listed cell line and got no answer on the home phone. Thanks

## 2025-05-22 ENCOUNTER — OFFICE VISIT (OUTPATIENT)
Dept: PAIN MEDICINE | Facility: CLINIC | Age: 65
End: 2025-05-22
Payer: MEDICARE

## 2025-05-22 VITALS
HEIGHT: 71 IN | BODY MASS INDEX: 38.37 KG/M2 | WEIGHT: 274.06 LBS | SYSTOLIC BLOOD PRESSURE: 142 MMHG | HEART RATE: 55 BPM | DIASTOLIC BLOOD PRESSURE: 78 MMHG

## 2025-05-22 DIAGNOSIS — M96.89 POSTOPERATIVE SURGICAL COMPLICATION INVOLVING MUSCULOSKELETAL SYSTEM ASSOCIATED WITH NON-MUSCULOSKELETAL PROCEDURE, UNSPECIFIED COMPLICATION: ICD-10-CM

## 2025-05-22 DIAGNOSIS — G89.18 PAIN AT SURGICAL SITE: Primary | ICD-10-CM

## 2025-05-22 PROCEDURE — 1159F MED LIST DOCD IN RCRD: CPT | Mod: CPTII,S$GLB,, | Performed by: STUDENT IN AN ORGANIZED HEALTH CARE EDUCATION/TRAINING PROGRAM

## 2025-05-22 PROCEDURE — 99999 PR PBB SHADOW E&M-EST. PATIENT-LVL III: CPT | Mod: PBBFAC,,, | Performed by: STUDENT IN AN ORGANIZED HEALTH CARE EDUCATION/TRAINING PROGRAM

## 2025-05-22 PROCEDURE — 3078F DIAST BP <80 MM HG: CPT | Mod: CPTII,S$GLB,, | Performed by: STUDENT IN AN ORGANIZED HEALTH CARE EDUCATION/TRAINING PROGRAM

## 2025-05-22 PROCEDURE — 3077F SYST BP >= 140 MM HG: CPT | Mod: CPTII,S$GLB,, | Performed by: STUDENT IN AN ORGANIZED HEALTH CARE EDUCATION/TRAINING PROGRAM

## 2025-05-22 PROCEDURE — 3008F BODY MASS INDEX DOCD: CPT | Mod: CPTII,S$GLB,, | Performed by: STUDENT IN AN ORGANIZED HEALTH CARE EDUCATION/TRAINING PROGRAM

## 2025-05-22 PROCEDURE — 99213 OFFICE O/P EST LOW 20 MIN: CPT | Mod: S$GLB,,, | Performed by: STUDENT IN AN ORGANIZED HEALTH CARE EDUCATION/TRAINING PROGRAM

## 2025-05-22 RX ORDER — FEXOFENADINE HCL 180 MG/1
180 TABLET ORAL DAILY
Qty: 60 TABLET | Refills: 0 | Status: SHIPPED | OUTPATIENT
Start: 2025-05-22 | End: 2025-05-22

## 2025-05-22 RX ORDER — HYDROCORTISONE 25 MG/G
CREAM TOPICAL 2 TIMES DAILY
Qty: 28 G | Refills: 0 | Status: SHIPPED | OUTPATIENT
Start: 2025-05-22

## 2025-05-22 RX ORDER — FEXOFENADINE HCL 180 MG/1
180 TABLET ORAL DAILY
Qty: 60 TABLET | Refills: 0 | Status: SHIPPED | OUTPATIENT
Start: 2025-05-22 | End: 2025-07-21

## 2025-05-22 RX ORDER — HYDROCORTISONE 25 MG/G
CREAM TOPICAL 2 TIMES DAILY
Qty: 28 G | Refills: 0 | Status: SHIPPED | OUTPATIENT
Start: 2025-05-22 | End: 2025-05-22

## 2025-05-22 NOTE — PROGRESS NOTES
"[facility]    PCP:   Savage Vizcaino Jr., MD    CHIEF COMPLAINT:   Procedure follow up    HISTORY OF PRESENT ILLNESS:   Gonzalez Pollard presents for follow-up after IPG pocket revision on 4/7.  His complaints today are unfortunately congruent with the complaints that led to this revision.  He endorses stinging pains along the incisions at both the old IPG site and the new one.  He also endorses allodynia to light touch over these incisions.  These are very bothersome.  He denies systemic symptoms such as fevers, chills, nausea, vomiting, diarrhea.  He denies new neurologic symptoms or loss of efficacy of the stimulation for his chronic back and leg pain.    PHYSICAL EXAMINATION  VITALS:   Vitals:    05/22/25 0825   BP: (!) 142/78   Pulse: (!) 55   Weight: 124.3 kg (274 lb 0.5 oz)   Height: 5' 11" (1.803 m)   PainSc:   8   PainLoc: Back     Physical Exam  Skin:     Comments:   See media tab for photographs of incisions as well as ultrasound  Mild erythema around both incisions  Local hyperthermia around both incisions  Allodynia to light touch in about a 1 cm diameter around each incision  Mild induration of the soft tissue directly under each incision  No fluctuance, drainage  Ultrasound did not show any fluid collections around the IPG, leads, or old pocket site.       ASSESSMENT:   65 y.o. year old male with pain at his prior surgical sites.  Differential includes suture allergy, IPG component allergy, and infection.  Given the presence of symptoms about the new and old surgical sites, I am most suspicious for a potential suture allergy.    Encounter Diagnoses   Name Primary?    Pain at surgical site Yes    Postoperative surgical complication involving musculoskeletal system associated with non-musculoskeletal procedure, unspecified complication      PLAN:  Discussed the differential with the patient in his wife.  We will order infectious labs (CBC, BMP, ESR, CRP) out of caution.  If his " inflammatory markers are significantly different/elevated compared to prior readings, we will have a serious discussion about explant of the entire system.  We discussed that the occurrence of systemic symptoms warrants emergent evaluation.  Oral fexofenadine 180 mg daily  Hydrocortisone 2.5% topical application b.i.d. to incisions  Can consider corticosteroid injection at the incisions if symptoms are refractory and concern for infection remains low  Follow up TBD by lab results    Nikunj Reynolds MD  This note was completed with dictation software and grammatical/syntax errors may exist.

## 2025-05-23 PROCEDURE — 85025 COMPLETE CBC W/AUTO DIFF WBC: CPT | Performed by: STUDENT IN AN ORGANIZED HEALTH CARE EDUCATION/TRAINING PROGRAM

## 2025-05-23 PROCEDURE — 82310 ASSAY OF CALCIUM: CPT | Performed by: STUDENT IN AN ORGANIZED HEALTH CARE EDUCATION/TRAINING PROGRAM

## 2025-05-23 PROCEDURE — 85651 RBC SED RATE NONAUTOMATED: CPT | Performed by: STUDENT IN AN ORGANIZED HEALTH CARE EDUCATION/TRAINING PROGRAM

## 2025-05-23 PROCEDURE — 86140 C-REACTIVE PROTEIN: CPT | Performed by: STUDENT IN AN ORGANIZED HEALTH CARE EDUCATION/TRAINING PROGRAM

## 2025-05-26 ENCOUNTER — DOCUMENTATION ONLY (OUTPATIENT)
Dept: PAIN MEDICINE | Facility: CLINIC | Age: 65
End: 2025-05-26
Payer: MEDICARE

## 2025-05-26 NOTE — PROGRESS NOTES
Spoke with the patient regarding the results of our lab studies from last Friday.  No markers of acute infection.  He does have a chronically elevated ESR which has been stable over the last few months.  CBC, BNP, CRP essentially unremarkable other than elevated blood glucose.  No leukocytosis, no left shift.  He did  and begin taking the fexofenadine and applying the hydrocortisone cream.  He notes a significant improvement in his symptoms, 50% or more.  He is content with this level of relief.  He will continue to use the medications as directed for another few weeks and let me know symptoms are progressing.  He knows that any systemic symptoms of infection like fever, chills, nausea vomiting, or any local symptoms of infection like drainage, fluctuance, or dehiscence should prompt emergent evaluation.    Nikunj Reynolds MD

## 2025-07-14 RX ORDER — FEXOFENADINE HYDROCHLORIDE 180 MG/1
TABLET, FILM COATED ORAL
Qty: 90 TABLET | Refills: 1 | Status: SHIPPED | OUTPATIENT
Start: 2025-07-14

## (undated) DEVICE — PACK CUSTOM UNIV BASIN SLI

## (undated) DEVICE — SUT MONOCRYL 4-0 PS-2

## (undated) DEVICE — CLOSURE SKIN STERI STRIP 1/2X4

## (undated) DEVICE — DRAPE LAP T SHT W/ INSTR PAD

## (undated) DEVICE — SUT 2/0 30IN SILK BLK BRAI

## (undated) DEVICE — KIT OMNIA CHARGER

## (undated) DEVICE — BINDER ABDOM 4PANEL 12IN LG/XL

## (undated) DEVICE — MITT SURGICAL PREP

## (undated) DEVICE — SUT ETHILON 2-0 BLK PS-2

## (undated) DEVICE — GLOVE SENSICARE PI ALOE 8

## (undated) DEVICE — GLOVE SENSICARE PI ALOE 6.5

## (undated) DEVICE — CORD BIPOLAR 12 FOOT

## (undated) DEVICE — SYR ONLY LUER LOCK 20CC

## (undated) DEVICE — TAPE ADH MEDIPORE 4 X 10YDS

## (undated) DEVICE — TIP YANKAUERS BULB NO VENT

## (undated) DEVICE — STAPLER SKIN ROTATING HEAD

## (undated) DEVICE — CLIPPER BLADE MOD 4406 (CAREF)

## (undated) DEVICE — DRAPE INCISE IOBAN 2 23X17IN

## (undated) DEVICE — STRAP OR TABLE 5IN X 72IN

## (undated) DEVICE — GLOVE SENSICARE PI GRN 6.5

## (undated) DEVICE — DRAPE C ARM 42 X 120 10/BX

## (undated) DEVICE — GOWN POLY REINF BRTH SLV XL

## (undated) DEVICE — DRESSING TRANS 6X8 TEGADERM

## (undated) DEVICE — KIT TEMPLATE IPG

## (undated) DEVICE — GLOVE SENSICARE PI ALOE 6

## (undated) DEVICE — TOWEL OR DISP STRL BLUE 4/PK

## (undated) DEVICE — GOWN POLY REINF SET SLV XL

## (undated) DEVICE — NDL SAFETY 25G X 1.5 ECLIPSE

## (undated) DEVICE — NDL SAFETY 21G X 1 1/2 ECLPSE

## (undated) DEVICE — ELECTRODE REM PLYHSV RETURN 9

## (undated) DEVICE — GAUZE WOVEN STRL 12-PLY 4X4IN

## (undated) DEVICE — SUT 2-0 VICRYL / SH (J417)

## (undated) DEVICE — SYR IRRIGATION BULB STER 60ML

## (undated) DEVICE — GLOVE SENSICARE PI GRN 6

## (undated) DEVICE — GLOVE SENSICARE PI GRN 7

## (undated) DEVICE — APPLICATOR CHLORAPREP ORN 26ML

## (undated) DEVICE — NDL SPINAL SPINOCAN 22GX3.5

## (undated) DEVICE — APPLICATOR CHLRAPRP 26ML

## (undated) DEVICE — BLADE SURG #15 CARBON STEEL

## (undated) DEVICE — FORCEP BAYO INSU SGL USE STRGT

## (undated) DEVICE — SOL NACL IRR 1000ML BTL

## (undated) DEVICE — KIT INSERTION NEEDLE 6

## (undated) DEVICE — ADHESIVE DERMABOND ADVANCED

## (undated) DEVICE — DRAPE ORTHOMAX BAR

## (undated) DEVICE — Device

## (undated) DEVICE — GOWN POLY REINF BRTH SLV LG

## (undated) DEVICE — GLOVE SENSICARE PI ALOE 7

## (undated) DEVICE — PENCIL SMK EVAC CONNECTOR 10FT

## (undated) DEVICE — DRAPE THREE-QTR REINF 53X77IN

## (undated) DEVICE — SYR 10CC LUER LOCK